# Patient Record
Sex: FEMALE | Race: ASIAN | NOT HISPANIC OR LATINO
[De-identification: names, ages, dates, MRNs, and addresses within clinical notes are randomized per-mention and may not be internally consistent; named-entity substitution may affect disease eponyms.]

---

## 2017-06-12 ENCOUNTER — RESULT REVIEW (OUTPATIENT)
Age: 27
End: 2017-06-12

## 2017-08-04 ENCOUNTER — APPOINTMENT (OUTPATIENT)
Dept: OPHTHALMOLOGY | Facility: CLINIC | Age: 27
End: 2017-08-04
Payer: COMMERCIAL

## 2017-08-04 PROCEDURE — 92004 COMPRE OPH EXAM NEW PT 1/>: CPT

## 2017-08-04 PROCEDURE — 92020 GONIOSCOPY: CPT

## 2017-08-09 ENCOUNTER — APPOINTMENT (OUTPATIENT)
Dept: OPHTHALMOLOGY | Facility: CLINIC | Age: 27
End: 2017-08-09
Payer: SELF-PAY

## 2017-08-09 PROCEDURE — 92015 DETERMINE REFRACTIVE STATE: CPT

## 2017-08-28 ENCOUNTER — EMERGENCY (EMERGENCY)
Facility: HOSPITAL | Age: 27
LOS: 1 days | Discharge: HOME | End: 2017-08-28
Attending: EMERGENCY MEDICINE | Admitting: EMERGENCY MEDICINE
Payer: COMMERCIAL

## 2017-08-28 VITALS
HEART RATE: 87 BPM | RESPIRATION RATE: 18 BRPM | SYSTOLIC BLOOD PRESSURE: 130 MMHG | OXYGEN SATURATION: 100 % | DIASTOLIC BLOOD PRESSURE: 89 MMHG | TEMPERATURE: 98 F

## 2017-08-28 VITALS
DIASTOLIC BLOOD PRESSURE: 86 MMHG | RESPIRATION RATE: 17 BRPM | HEART RATE: 92 BPM | WEIGHT: 119.93 LBS | SYSTOLIC BLOOD PRESSURE: 123 MMHG | OXYGEN SATURATION: 98 % | TEMPERATURE: 98 F

## 2017-08-28 DIAGNOSIS — Z77.21 CONTACT WITH AND (SUSPECTED) EXPOSURE TO POTENTIALLY HAZARDOUS BODY FLUIDS: ICD-10-CM

## 2017-08-28 DIAGNOSIS — S69.92XA UNSPECIFIED INJURY OF LEFT WRIST, HAND AND FINGER(S), INITIAL ENCOUNTER: ICD-10-CM

## 2017-08-28 DIAGNOSIS — Z79.899 OTHER LONG TERM (CURRENT) DRUG THERAPY: ICD-10-CM
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
## 2017-08-28 LAB
ALBUMIN SERPL ELPH-MCNC: 4.7 G/DL — SIGNIFICANT CHANGE UP (ref 3.3–5)
ALP SERPL-CCNC: 48 U/L — SIGNIFICANT CHANGE UP (ref 40–120)
ALT FLD-CCNC: 10 U/L — SIGNIFICANT CHANGE UP (ref 10–45)
ANION GAP SERPL CALC-SCNC: 13 MMOL/L — SIGNIFICANT CHANGE UP (ref 5–17)
AST SERPL-CCNC: 16 U/L — SIGNIFICANT CHANGE UP (ref 10–40)
BASOPHILS NFR BLD AUTO: 0.4 % — SIGNIFICANT CHANGE UP (ref 0–2)
BILIRUB SERPL-MCNC: 0.5 MG/DL — SIGNIFICANT CHANGE UP (ref 0.2–1.2)
BUN SERPL-MCNC: 18 MG/DL — SIGNIFICANT CHANGE UP (ref 7–23)
CALCIUM SERPL-MCNC: 9.8 MG/DL — SIGNIFICANT CHANGE UP (ref 8.4–10.5)
CHLORIDE SERPL-SCNC: 100 MMOL/L — SIGNIFICANT CHANGE UP (ref 96–108)
CO2 SERPL-SCNC: 26 MMOL/L — SIGNIFICANT CHANGE UP (ref 22–31)
CREAT SERPL-MCNC: 0.8 MG/DL — SIGNIFICANT CHANGE UP (ref 0.5–1.3)
EOSINOPHIL NFR BLD AUTO: 1.5 % — SIGNIFICANT CHANGE UP (ref 0–6)
GLUCOSE SERPL-MCNC: 87 MG/DL — SIGNIFICANT CHANGE UP (ref 70–99)
HBV SURFACE AG SER-ACNC: SIGNIFICANT CHANGE UP
HCT VFR BLD CALC: 40.6 % — SIGNIFICANT CHANGE UP (ref 34.5–45)
HCV AB S/CO SERPL IA: 0.08 S/CO — SIGNIFICANT CHANGE UP
HCV AB SERPL-IMP: SIGNIFICANT CHANGE UP
HGB BLD-MCNC: 14.4 G/DL — SIGNIFICANT CHANGE UP (ref 11.5–15.5)
HIV 1+2 AB+HIV1 P24 AG SERPL QL IA: SIGNIFICANT CHANGE UP
LYMPHOCYTES # BLD AUTO: 26.3 % — SIGNIFICANT CHANGE UP (ref 13–44)
MCHC RBC-ENTMCNC: 30.9 PG — SIGNIFICANT CHANGE UP (ref 27–34)
MCHC RBC-ENTMCNC: 35.5 G/DL — SIGNIFICANT CHANGE UP (ref 32–36)
MCV RBC AUTO: 87.1 FL — SIGNIFICANT CHANGE UP (ref 80–100)
MONOCYTES NFR BLD AUTO: 7.6 % — SIGNIFICANT CHANGE UP (ref 2–14)
NEUTROPHILS NFR BLD AUTO: 64.2 % — SIGNIFICANT CHANGE UP (ref 43–77)
PLATELET # BLD AUTO: 284 K/UL — SIGNIFICANT CHANGE UP (ref 150–400)
POTASSIUM SERPL-MCNC: 3.7 MMOL/L — SIGNIFICANT CHANGE UP (ref 3.5–5.3)
POTASSIUM SERPL-SCNC: 3.7 MMOL/L — SIGNIFICANT CHANGE UP (ref 3.5–5.3)
PROT SERPL-MCNC: 8.1 G/DL — SIGNIFICANT CHANGE UP (ref 6–8.3)
RBC # BLD: 4.66 M/UL — SIGNIFICANT CHANGE UP (ref 3.8–5.2)
RBC # FLD: 12.3 % — SIGNIFICANT CHANGE UP (ref 10.3–16.9)
SODIUM SERPL-SCNC: 139 MMOL/L — SIGNIFICANT CHANGE UP (ref 135–145)
WBC # BLD: 7.4 K/UL — SIGNIFICANT CHANGE UP (ref 3.8–10.5)
WBC # FLD AUTO: 7.4 K/UL — SIGNIFICANT CHANGE UP (ref 3.8–10.5)

## 2017-08-28 PROCEDURE — 80074 ACUTE HEPATITIS PANEL: CPT

## 2017-08-28 PROCEDURE — 87389 HIV-1 AG W/HIV-1&-2 AB AG IA: CPT

## 2017-08-28 PROCEDURE — 80053 COMPREHEN METABOLIC PANEL: CPT

## 2017-08-28 PROCEDURE — 36415 COLL VENOUS BLD VENIPUNCTURE: CPT

## 2017-08-28 PROCEDURE — 99283 EMERGENCY DEPT VISIT LOW MDM: CPT

## 2017-08-28 PROCEDURE — 99284 EMERGENCY DEPT VISIT MOD MDM: CPT

## 2017-08-28 PROCEDURE — 85025 COMPLETE CBC W/AUTO DIFF WBC: CPT

## 2017-08-28 RX ORDER — EMTRICITABINE AND TENOFOVIR DISOPROXIL FUMARATE 200; 300 MG/1; MG/1
1 TABLET, FILM COATED ORAL ONCE
Qty: 0 | Refills: 0 | Status: COMPLETED | OUTPATIENT
Start: 2017-08-28 | End: 2017-08-28

## 2017-08-28 RX ORDER — RALTEGRAVIR 400 MG/1
1 TABLET, FILM COATED ORAL
Qty: 14 | Refills: 0 | OUTPATIENT
Start: 2017-08-28 | End: 2017-09-04

## 2017-08-28 RX ORDER — RALTEGRAVIR 400 MG/1
400 TABLET, FILM COATED ORAL ONCE
Qty: 0 | Refills: 0 | Status: COMPLETED | OUTPATIENT
Start: 2017-08-28 | End: 2017-08-28

## 2017-08-28 RX ORDER — EMTRICITABINE AND TENOFOVIR DISOPROXIL FUMARATE 200; 300 MG/1; MG/1
1 TABLET, FILM COATED ORAL
Qty: 7 | Refills: 0 | OUTPATIENT
Start: 2017-08-28 | End: 2017-09-04

## 2017-08-28 RX ADMIN — EMTRICITABINE AND TENOFOVIR DISOPROXIL FUMARATE 1 TABLET(S): 200; 300 TABLET, FILM COATED ORAL at 18:26

## 2017-08-28 RX ADMIN — RALTEGRAVIR 400 MILLIGRAM(S): 400 TABLET, FILM COATED ORAL at 18:26

## 2017-08-28 NOTE — ED ADULT NURSE REASSESSMENT NOTE - NS ED NURSE REASSESS COMMENT FT1
Patient reports that she does not want to take HIV prophylaxis at this time, wants to think about it. MD Baum made aware.

## 2017-08-28 NOTE — ED PROVIDER NOTE - CARE PLAN
Principal Discharge DX:	Needle stick injury Principal Discharge DX:	Needle stick injury  Secondary Diagnosis:	Exposure to blood

## 2017-08-28 NOTE — ED PROVIDER NOTE - OBJECTIVE STATEMENT
26 yo F s/p exposure to blood 2 hrs ago- has open wound on radial aspect of left index finger - exposure was transient- she washed the area with soap and water- TET UTD- has had Hep B vaccination series- no information regarding source pt at this time.

## 2017-08-28 NOTE — ED PROVIDER NOTE - MEDICAL DECISION MAKING DETAILS
needle stick prophylaxis offered  pt declined in ED  will RX 1 week supply to [facility] ED pharm  tet utlucille  hepatitis utd blood exposure to possible open area near radial aspect adjacent to nail fold---needle stick prophylaxis offered  pt declined in ED  will RX 1 week supply to [facility] ED pharm  jose yates  hepatitis utd

## 2017-08-29 LAB
HAV IGM SER-ACNC: SIGNIFICANT CHANGE UP
HBV CORE IGM SER-ACNC: SIGNIFICANT CHANGE UP

## 2018-07-03 ENCOUNTER — APPOINTMENT (OUTPATIENT)
Dept: OPHTHALMOLOGY | Facility: CLINIC | Age: 28
End: 2018-07-03
Payer: COMMERCIAL

## 2018-07-03 PROCEDURE — 92012 INTRM OPH EXAM EST PATIENT: CPT

## 2018-07-30 ENCOUNTER — RESULT REVIEW (OUTPATIENT)
Age: 28
End: 2018-07-30

## 2019-08-02 ENCOUNTER — RESULT REVIEW (OUTPATIENT)
Age: 29
End: 2019-08-02

## 2019-08-06 ENCOUNTER — NON-APPOINTMENT (OUTPATIENT)
Age: 29
End: 2019-08-06

## 2019-08-06 ENCOUNTER — APPOINTMENT (OUTPATIENT)
Dept: OPHTHALMOLOGY | Facility: CLINIC | Age: 29
End: 2019-08-06
Payer: COMMERCIAL

## 2019-08-06 PROCEDURE — 92012 INTRM OPH EXAM EST PATIENT: CPT

## 2019-12-11 ENCOUNTER — APPOINTMENT (OUTPATIENT)
Dept: GASTROENTEROLOGY | Facility: CLINIC | Age: 29
End: 2019-12-11

## 2019-12-24 ENCOUNTER — APPOINTMENT (OUTPATIENT)
Dept: GASTROENTEROLOGY | Facility: CLINIC | Age: 29
End: 2019-12-24

## 2020-04-26 ENCOUNTER — MESSAGE (OUTPATIENT)
Age: 30
End: 2020-04-26

## 2020-05-05 LAB
SARS-COV-2 IGG SERPL IA-ACNC: <0.1 INDEX
SARS-COV-2 IGG SERPL QL IA: NEGATIVE

## 2020-09-01 ENCOUNTER — NON-APPOINTMENT (OUTPATIENT)
Age: 30
End: 2020-09-01

## 2020-09-01 ENCOUNTER — APPOINTMENT (OUTPATIENT)
Dept: OPHTHALMOLOGY | Facility: CLINIC | Age: 30
End: 2020-09-01
Payer: COMMERCIAL

## 2020-09-01 PROCEDURE — 92012 INTRM OPH EXAM EST PATIENT: CPT

## 2021-05-21 ENCOUNTER — TRANSCRIPTION ENCOUNTER (OUTPATIENT)
Age: 31
End: 2021-05-21

## 2021-10-06 ENCOUNTER — NON-APPOINTMENT (OUTPATIENT)
Age: 31
End: 2021-10-06

## 2021-10-22 ENCOUNTER — RESULT REVIEW (OUTPATIENT)
Age: 31
End: 2021-10-22

## 2021-10-22 ENCOUNTER — APPOINTMENT (OUTPATIENT)
Dept: INTERNAL MEDICINE | Facility: CLINIC | Age: 31
End: 2021-10-22

## 2021-11-02 ENCOUNTER — NON-APPOINTMENT (OUTPATIENT)
Age: 31
End: 2021-11-02

## 2021-11-05 ENCOUNTER — APPOINTMENT (OUTPATIENT)
Dept: ORTHOPEDIC SURGERY | Facility: CLINIC | Age: 31
End: 2021-11-05
Payer: COMMERCIAL

## 2021-11-05 VITALS — WEIGHT: 130 LBS | RESPIRATION RATE: 16 BRPM | BODY MASS INDEX: 23.33 KG/M2 | HEIGHT: 62.5 IN

## 2021-11-05 DIAGNOSIS — Z78.9 OTHER SPECIFIED HEALTH STATUS: ICD-10-CM

## 2021-11-05 PROCEDURE — 99203 OFFICE O/P NEW LOW 30 MIN: CPT

## 2022-05-06 ENCOUNTER — APPOINTMENT (OUTPATIENT)
Dept: OPHTHALMOLOGY | Facility: CLINIC | Age: 32
End: 2022-05-06

## 2022-05-13 ENCOUNTER — APPOINTMENT (OUTPATIENT)
Dept: OPHTHALMOLOGY | Facility: CLINIC | Age: 32
End: 2022-05-13
Payer: COMMERCIAL

## 2022-05-13 ENCOUNTER — NON-APPOINTMENT (OUTPATIENT)
Age: 32
End: 2022-05-13

## 2022-05-13 PROCEDURE — 92014 COMPRE OPH EXAM EST PT 1/>: CPT

## 2022-05-16 ENCOUNTER — NON-APPOINTMENT (OUTPATIENT)
Age: 32
End: 2022-05-16

## 2022-05-31 NOTE — ED PROVIDER NOTE - NS_EDPROVIDERDISPOUSERTYPE_ED_A_ED
Attending Attestation (For Attendings USE Only)... Erythromycin Pregnancy And Lactation Text: This medication is Pregnancy Category B and is considered safe during pregnancy. It is also excreted in breast milk.

## 2022-06-14 ENCOUNTER — RESULT REVIEW (OUTPATIENT)
Age: 32
End: 2022-06-14

## 2022-08-29 ENCOUNTER — NON-APPOINTMENT (OUTPATIENT)
Age: 32
End: 2022-08-29

## 2022-09-20 ENCOUNTER — APPOINTMENT (OUTPATIENT)
Dept: FAMILY MEDICINE | Facility: CLINIC | Age: 32
End: 2022-09-20

## 2022-09-20 VITALS
DIASTOLIC BLOOD PRESSURE: 78 MMHG | HEIGHT: 62.5 IN | RESPIRATION RATE: 14 BRPM | HEART RATE: 72 BPM | TEMPERATURE: 98.2 F | BODY MASS INDEX: 23.72 KG/M2 | OXYGEN SATURATION: 98 % | WEIGHT: 132.19 LBS | SYSTOLIC BLOOD PRESSURE: 112 MMHG

## 2022-09-20 DIAGNOSIS — Z11.3 ENCOUNTER FOR SCREENING FOR INFECTIONS WITH A PREDOMINANTLY SEXUAL MODE OF TRANSMISSION: ICD-10-CM

## 2022-09-20 DIAGNOSIS — E28.2 POLYCYSTIC OVARIAN SYNDROME: ICD-10-CM

## 2022-09-20 DIAGNOSIS — H10.13 ACUTE ATOPIC CONJUNCTIVITIS, BILATERAL: ICD-10-CM

## 2022-09-20 DIAGNOSIS — H52.223 REGULAR ASTIGMATISM, BILATERAL: ICD-10-CM

## 2022-09-20 DIAGNOSIS — H04.123 DRY EYE SYNDROME OF BILATERAL LACRIMAL GLANDS: ICD-10-CM

## 2022-09-20 DIAGNOSIS — Z82.49 FAMILY HISTORY OF ISCHEMIC HEART DISEASE AND OTHER DISEASES OF THE CIRCULATORY SYSTEM: ICD-10-CM

## 2022-09-20 DIAGNOSIS — R51.9 HEADACHE, UNSPECIFIED: ICD-10-CM

## 2022-09-20 DIAGNOSIS — M65.331 TRIGGER FINGER, RIGHT MIDDLE FINGER: ICD-10-CM

## 2022-09-20 DIAGNOSIS — H52.11 MYOPIA, RIGHT EYE: ICD-10-CM

## 2022-09-20 DIAGNOSIS — Z00.00 ENCOUNTER FOR GENERAL ADULT MEDICAL EXAMINATION W/OUT ABNORMAL FINDINGS: ICD-10-CM

## 2022-09-20 PROCEDURE — 36415 COLL VENOUS BLD VENIPUNCTURE: CPT

## 2022-09-20 PROCEDURE — 99204 OFFICE O/P NEW MOD 45 MIN: CPT | Mod: 25

## 2022-09-20 PROCEDURE — 81002 URINALYSIS NONAUTO W/O SCOPE: CPT

## 2022-09-20 PROCEDURE — 99385 PREV VISIT NEW AGE 18-39: CPT | Mod: 25

## 2022-09-20 NOTE — HISTORY OF PRESENT ILLNESS
[FreeTextEntry1] : establish care \par headaches  [de-identified] : 31 yo f presents to establish care. \par Complains of headaches x 1 month, occur in the back of the head, left side. No radiation of pain, occur a few times per week. Sometimes last a few hours, sometimes the full day.  Tylenol will help the headache after a few hours. Admits increased hydration since the headaches started (now 4 bottles of water), has not affected the frequency of headaches. Sleeps well for the most part, approximately 6 hours per night, patient is a light sleeper. Occurs at 2-3 pm, never on the weekend. No stress or anxiety at work. Recently had her eyes checked, and all was wnl. Mentioned history of headaches in the past, never this constant. No changes to her lifestyle. Mentioned pressure in the back of the head. 3-6/10 at their worst, no vision changes, no balance changes. Light or sound does not make headache worse. Mentioned poor posture.

## 2022-09-20 NOTE — HEALTH RISK ASSESSMENT
[Good] : ~his/her~  mood as  good [Never] : Never [Yes] : Yes [2 - 4 times a month (2 pts)] : 2-4 times a month (2 points) [1 or 2 (0 pts)] : 1 or 2 (0 points) [Never (0 pts)] : Never (0 points) [No] : In the past 12 months have you used drugs other than those required for medical reasons? No [0] : 2) Feeling down, depressed, or hopeless: Not at all (0) [PHQ-2 Negative - No further assessment needed] : PHQ-2 Negative - No further assessment needed [Audit-CScore] : 2 [de-identified] : walking, [place]  [de-identified] : fruits, veggies  [[id number]] : 0 [Patient reported PAP Smear was normal] : Patient reported PAP Smear was normal [HIV Test offered] : HIV Test offered [Hepatitis C test offered] : Hepatitis C test offered [Change in mental status noted] : No change in mental status noted [Language] : denies difficulty with language [None] : None [With Significant Other] : lives with significant other [Employed] : employed [] :  [Sexually Active] : sexually active [High Risk Behavior] : no high risk behavior [Feels Safe at Home] : Feels safe at home [Fully functional (bathing, dressing, toileting, transferring, walking, feeding)] : Fully functional (bathing, dressing, toileting, transferring, walking, feeding) [Fully functional (using the telephone, shopping, preparing meals, housekeeping, doing laundry, using] : Fully functional and needs no help or supervision to perform IADLs (using the telephone, shopping, preparing meals, housekeeping, doing laundry, using transportation, managing medications and managing finances) [Reports changes in hearing] : Reports no changes in hearing [Reports changes in vision] : Reports no changes in vision [PapSmearDate] : 02/22 [FreeTextEntry2] : 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  [de-identified] : m

## 2022-09-20 NOTE — PLAN
[FreeTextEntry1] : follow up labs, labs drawn in office \par \par headaches \par increased frequency, lasting longer \par will send for mri \par discussed supportive options as well until resulted-- posture, massage, sleep hygiene, hydration, stress reduction \par otc meds prn

## 2022-09-28 LAB
25(OH)D3 SERPL-MCNC: 35.9 NG/ML
ALBUMIN SERPL ELPH-MCNC: 4.9 G/DL
ALP BLD-CCNC: 59 U/L
ALT SERPL-CCNC: 16 U/L
ANION GAP SERPL CALC-SCNC: 12 MMOL/L
AST SERPL-CCNC: 18 U/L
BASOPHILS # BLD AUTO: 0.04 K/UL
BASOPHILS NFR BLD AUTO: 0.7 %
BILIRUB SERPL-MCNC: 0.6 MG/DL
BUN SERPL-MCNC: 13 MG/DL
C TRACH RRNA SPEC QL NAA+PROBE: NOT DETECTED
CALCIUM SERPL-MCNC: 9.5 MG/DL
CHLORIDE SERPL-SCNC: 104 MMOL/L
CHOLEST SERPL-MCNC: 169 MG/DL
CO2 SERPL-SCNC: 24 MMOL/L
CREAT SERPL-MCNC: 0.63 MG/DL
EGFR: 121 ML/MIN/1.73M2
EOSINOPHIL # BLD AUTO: 0.07 K/UL
EOSINOPHIL NFR BLD AUTO: 1.1 %
ESTIMATED AVERAGE GLUCOSE: 103 MG/DL
GLUCOSE SERPL-MCNC: 94 MG/DL
HAV IGM SER QL: NONREACTIVE
HBA1C MFR BLD HPLC: 5.2 %
HBV CORE IGM SER QL: NONREACTIVE
HBV SURFACE AG SER QL: NONREACTIVE
HCT VFR BLD CALC: 44.3 %
HCV AB SER QL: NONREACTIVE
HCV S/CO RATIO: 0.09 S/CO
HDLC SERPL-MCNC: 61 MG/DL
HGB BLD-MCNC: 13.8 G/DL
HIV1+2 AB SPEC QL IA.RAPID: NONREACTIVE
IMM GRANULOCYTES NFR BLD AUTO: 0 %
LDLC SERPL CALC-MCNC: 88 MG/DL
LYMPHOCYTES # BLD AUTO: 2.15 K/UL
LYMPHOCYTES NFR BLD AUTO: 35 %
MAN DIFF?: NORMAL
MCHC RBC-ENTMCNC: 29.1 PG
MCHC RBC-ENTMCNC: 31.2 GM/DL
MCV RBC AUTO: 93.5 FL
MONOCYTES # BLD AUTO: 0.41 K/UL
MONOCYTES NFR BLD AUTO: 6.7 %
N GONORRHOEA RRNA SPEC QL NAA+PROBE: NOT DETECTED
NEUTROPHILS # BLD AUTO: 3.48 K/UL
NEUTROPHILS NFR BLD AUTO: 56.5 %
NONHDLC SERPL-MCNC: 108 MG/DL
PLATELET # BLD AUTO: 278 K/UL
POTASSIUM SERPL-SCNC: 4.3 MMOL/L
PROT SERPL-MCNC: 7.5 G/DL
RBC # BLD: 4.74 M/UL
RBC # FLD: 13.3 %
SODIUM SERPL-SCNC: 140 MMOL/L
SOURCE AMPLIFICATION: NORMAL
T PALLIDUM AB SER QL IA: NEGATIVE
TRIGL SERPL-MCNC: 99 MG/DL
TSH SERPL-ACNC: 1.39 UIU/ML
WBC # FLD AUTO: 6.15 K/UL

## 2022-10-03 ENCOUNTER — NON-APPOINTMENT (OUTPATIENT)
Age: 32
End: 2022-10-03

## 2023-04-06 ENCOUNTER — APPOINTMENT (OUTPATIENT)
Dept: HUMAN REPRODUCTION | Facility: CLINIC | Age: 33
End: 2023-04-06
Payer: COMMERCIAL

## 2023-04-06 PROCEDURE — 99204 OFFICE O/P NEW MOD 45 MIN: CPT | Mod: 95

## 2023-04-12 ENCOUNTER — APPOINTMENT (OUTPATIENT)
Dept: HUMAN REPRODUCTION | Facility: CLINIC | Age: 33
End: 2023-04-12
Payer: COMMERCIAL

## 2023-04-12 PROCEDURE — 99213 OFFICE O/P EST LOW 20 MIN: CPT | Mod: 25

## 2023-04-12 PROCEDURE — 36415 COLL VENOUS BLD VENIPUNCTURE: CPT

## 2023-04-12 PROCEDURE — 76857 US EXAM PELVIC LIMITED: CPT

## 2023-07-06 ENCOUNTER — APPOINTMENT (OUTPATIENT)
Dept: HUMAN REPRODUCTION | Facility: CLINIC | Age: 33
End: 2023-07-06
Payer: COMMERCIAL

## 2023-07-06 PROCEDURE — 99214 OFFICE O/P EST MOD 30 MIN: CPT | Mod: 95

## 2023-07-12 ENCOUNTER — APPOINTMENT (OUTPATIENT)
Dept: HUMAN REPRODUCTION | Facility: CLINIC | Age: 33
End: 2023-07-12
Payer: COMMERCIAL

## 2023-07-12 PROCEDURE — 36415 COLL VENOUS BLD VENIPUNCTURE: CPT

## 2023-07-12 PROCEDURE — 76830 TRANSVAGINAL US NON-OB: CPT

## 2023-07-12 PROCEDURE — S4042: CPT

## 2023-07-28 ENCOUNTER — APPOINTMENT (OUTPATIENT)
Dept: HUMAN REPRODUCTION | Facility: CLINIC | Age: 33
End: 2023-07-28
Payer: COMMERCIAL

## 2023-07-28 PROCEDURE — S4042: CPT

## 2023-07-28 PROCEDURE — 99213 OFFICE O/P EST LOW 20 MIN: CPT | Mod: 25

## 2023-07-28 PROCEDURE — 36415 COLL VENOUS BLD VENIPUNCTURE: CPT

## 2023-07-28 PROCEDURE — 82670 ASSAY OF TOTAL ESTRADIOL: CPT

## 2023-07-28 PROCEDURE — 76830 TRANSVAGINAL US NON-OB: CPT

## 2023-07-28 PROCEDURE — 84702 CHORIONIC GONADOTROPIN TEST: CPT

## 2023-07-28 PROCEDURE — 84144 ASSAY OF PROGESTERONE: CPT

## 2023-08-04 ENCOUNTER — APPOINTMENT (OUTPATIENT)
Dept: HUMAN REPRODUCTION | Facility: CLINIC | Age: 33
End: 2023-08-04
Payer: SELF-PAY

## 2023-08-04 PROCEDURE — 84144 ASSAY OF PROGESTERONE: CPT

## 2023-08-04 PROCEDURE — 83002 ASSAY OF GONADOTROPIN (LH): CPT | Mod: QW

## 2023-08-04 PROCEDURE — 36415 COLL VENOUS BLD VENIPUNCTURE: CPT

## 2023-08-04 PROCEDURE — 76857 US EXAM PELVIC LIMITED: CPT

## 2023-08-04 PROCEDURE — 82670 ASSAY OF TOTAL ESTRADIOL: CPT

## 2023-08-14 ENCOUNTER — APPOINTMENT (OUTPATIENT)
Dept: HUMAN REPRODUCTION | Facility: CLINIC | Age: 33
End: 2023-08-14
Payer: SELF-PAY

## 2023-08-14 PROCEDURE — 84144 ASSAY OF PROGESTERONE: CPT

## 2023-08-14 PROCEDURE — 36415 COLL VENOUS BLD VENIPUNCTURE: CPT

## 2023-08-14 PROCEDURE — 99213 OFFICE O/P EST LOW 20 MIN: CPT | Mod: 25

## 2023-08-14 PROCEDURE — 82670 ASSAY OF TOTAL ESTRADIOL: CPT

## 2023-08-14 PROCEDURE — 76857 US EXAM PELVIC LIMITED: CPT

## 2023-08-14 PROCEDURE — 83002 ASSAY OF GONADOTROPIN (LH): CPT | Mod: QW

## 2023-08-16 ENCOUNTER — APPOINTMENT (OUTPATIENT)
Dept: HUMAN REPRODUCTION | Facility: CLINIC | Age: 33
End: 2023-08-16
Payer: SELF-PAY

## 2023-08-16 PROCEDURE — 58322 ARTIFICIAL INSEMINATION: CPT

## 2023-08-16 PROCEDURE — 99213 OFFICE O/P EST LOW 20 MIN: CPT | Mod: 25

## 2023-08-16 PROCEDURE — 89261 SPERM ISOLATION COMPLEX: CPT

## 2023-08-30 ENCOUNTER — APPOINTMENT (OUTPATIENT)
Dept: HUMAN REPRODUCTION | Facility: CLINIC | Age: 33
End: 2023-08-30
Payer: SELF-PAY

## 2023-08-30 PROCEDURE — 84702 CHORIONIC GONADOTROPIN TEST: CPT

## 2023-08-30 PROCEDURE — 36415 COLL VENOUS BLD VENIPUNCTURE: CPT

## 2023-08-30 PROCEDURE — 84144 ASSAY OF PROGESTERONE: CPT

## 2023-09-05 ENCOUNTER — APPOINTMENT (OUTPATIENT)
Dept: HUMAN REPRODUCTION | Facility: CLINIC | Age: 33
End: 2023-09-05
Payer: SELF-PAY

## 2023-09-05 PROCEDURE — 83002 ASSAY OF GONADOTROPIN (LH): CPT | Mod: QW

## 2023-09-05 PROCEDURE — S4042: CPT

## 2023-09-05 PROCEDURE — 76857 US EXAM PELVIC LIMITED: CPT

## 2023-09-05 PROCEDURE — 83001 ASSAY OF GONADOTROPIN (FSH): CPT | Mod: QW

## 2023-09-05 PROCEDURE — 99213 OFFICE O/P EST LOW 20 MIN: CPT | Mod: 25

## 2023-09-05 PROCEDURE — 82670 ASSAY OF TOTAL ESTRADIOL: CPT

## 2023-09-05 PROCEDURE — 36415 COLL VENOUS BLD VENIPUNCTURE: CPT

## 2023-09-05 PROCEDURE — 84702 CHORIONIC GONADOTROPIN TEST: CPT

## 2023-09-05 PROCEDURE — 84144 ASSAY OF PROGESTERONE: CPT

## 2023-09-12 ENCOUNTER — APPOINTMENT (OUTPATIENT)
Dept: HUMAN REPRODUCTION | Facility: CLINIC | Age: 33
End: 2023-09-12
Payer: SELF-PAY

## 2023-09-12 PROCEDURE — 83002 ASSAY OF GONADOTROPIN (LH): CPT | Mod: QW

## 2023-09-12 PROCEDURE — 36415 COLL VENOUS BLD VENIPUNCTURE: CPT

## 2023-09-12 PROCEDURE — 76857 US EXAM PELVIC LIMITED: CPT

## 2023-09-12 PROCEDURE — 82670 ASSAY OF TOTAL ESTRADIOL: CPT

## 2023-09-12 PROCEDURE — 99213 OFFICE O/P EST LOW 20 MIN: CPT | Mod: 25

## 2023-09-12 PROCEDURE — 84144 ASSAY OF PROGESTERONE: CPT

## 2023-09-14 ENCOUNTER — APPOINTMENT (OUTPATIENT)
Dept: HUMAN REPRODUCTION | Facility: CLINIC | Age: 33
End: 2023-09-14
Payer: SELF-PAY

## 2023-09-14 PROCEDURE — 99213 OFFICE O/P EST LOW 20 MIN: CPT | Mod: 25

## 2023-09-14 PROCEDURE — 82670 ASSAY OF TOTAL ESTRADIOL: CPT

## 2023-09-14 PROCEDURE — 83002 ASSAY OF GONADOTROPIN (LH): CPT | Mod: QW

## 2023-09-14 PROCEDURE — 76857 US EXAM PELVIC LIMITED: CPT

## 2023-09-14 PROCEDURE — 36415 COLL VENOUS BLD VENIPUNCTURE: CPT

## 2023-09-14 PROCEDURE — 84144 ASSAY OF PROGESTERONE: CPT

## 2023-09-17 ENCOUNTER — APPOINTMENT (OUTPATIENT)
Dept: HUMAN REPRODUCTION | Facility: CLINIC | Age: 33
End: 2023-09-17
Payer: SELF-PAY

## 2023-09-17 PROCEDURE — 36415 COLL VENOUS BLD VENIPUNCTURE: CPT

## 2023-09-17 PROCEDURE — 99213 OFFICE O/P EST LOW 20 MIN: CPT | Mod: 25

## 2023-09-17 PROCEDURE — 76857 US EXAM PELVIC LIMITED: CPT

## 2023-09-18 ENCOUNTER — APPOINTMENT (OUTPATIENT)
Dept: HUMAN REPRODUCTION | Facility: CLINIC | Age: 33
End: 2023-09-18
Payer: SELF-PAY

## 2023-09-18 PROCEDURE — 89261 SPERM ISOLATION COMPLEX: CPT

## 2023-09-18 PROCEDURE — 99213 OFFICE O/P EST LOW 20 MIN: CPT | Mod: 25

## 2023-09-18 PROCEDURE — 58322 ARTIFICIAL INSEMINATION: CPT

## 2023-10-02 ENCOUNTER — APPOINTMENT (OUTPATIENT)
Dept: HUMAN REPRODUCTION | Facility: CLINIC | Age: 33
End: 2023-10-02
Payer: SELF-PAY

## 2023-10-02 PROCEDURE — 36415 COLL VENOUS BLD VENIPUNCTURE: CPT

## 2023-10-09 ENCOUNTER — APPOINTMENT (OUTPATIENT)
Dept: HUMAN REPRODUCTION | Facility: CLINIC | Age: 33
End: 2023-10-09
Payer: SELF-PAY

## 2023-10-09 PROCEDURE — 36415 COLL VENOUS BLD VENIPUNCTURE: CPT

## 2023-10-09 PROCEDURE — 76830 TRANSVAGINAL US NON-OB: CPT

## 2023-10-09 PROCEDURE — S4042: CPT

## 2023-10-09 PROCEDURE — 83001 ASSAY OF GONADOTROPIN (FSH): CPT | Mod: QW

## 2023-10-09 PROCEDURE — 84702 CHORIONIC GONADOTROPIN TEST: CPT

## 2023-10-09 PROCEDURE — 84144 ASSAY OF PROGESTERONE: CPT

## 2023-10-09 PROCEDURE — 82670 ASSAY OF TOTAL ESTRADIOL: CPT

## 2023-10-09 PROCEDURE — 83002 ASSAY OF GONADOTROPIN (LH): CPT | Mod: QW

## 2023-10-16 ENCOUNTER — APPOINTMENT (OUTPATIENT)
Dept: HUMAN REPRODUCTION | Facility: CLINIC | Age: 33
End: 2023-10-16
Payer: SELF-PAY

## 2023-10-16 PROCEDURE — 36415 COLL VENOUS BLD VENIPUNCTURE: CPT

## 2023-10-16 PROCEDURE — 76857 US EXAM PELVIC LIMITED: CPT

## 2023-10-16 PROCEDURE — 99213 OFFICE O/P EST LOW 20 MIN: CPT | Mod: 25

## 2023-10-19 ENCOUNTER — APPOINTMENT (OUTPATIENT)
Dept: HUMAN REPRODUCTION | Facility: CLINIC | Age: 33
End: 2023-10-19
Payer: SELF-PAY

## 2023-10-19 PROCEDURE — 84144 ASSAY OF PROGESTERONE: CPT

## 2023-10-19 PROCEDURE — 36415 COLL VENOUS BLD VENIPUNCTURE: CPT

## 2023-10-19 PROCEDURE — 83002 ASSAY OF GONADOTROPIN (LH): CPT | Mod: QW

## 2023-10-19 PROCEDURE — 82670 ASSAY OF TOTAL ESTRADIOL: CPT

## 2023-10-19 PROCEDURE — 76857 US EXAM PELVIC LIMITED: CPT

## 2023-10-19 PROCEDURE — 99213 OFFICE O/P EST LOW 20 MIN: CPT | Mod: 25

## 2023-10-24 ENCOUNTER — APPOINTMENT (OUTPATIENT)
Dept: HUMAN REPRODUCTION | Facility: CLINIC | Age: 33
End: 2023-10-24
Payer: SELF-PAY

## 2023-10-24 PROCEDURE — 82670 ASSAY OF TOTAL ESTRADIOL: CPT

## 2023-10-24 PROCEDURE — 76857 US EXAM PELVIC LIMITED: CPT

## 2023-10-24 PROCEDURE — 83002 ASSAY OF GONADOTROPIN (LH): CPT | Mod: QW

## 2023-10-24 PROCEDURE — 84144 ASSAY OF PROGESTERONE: CPT

## 2023-10-24 PROCEDURE — 36415 COLL VENOUS BLD VENIPUNCTURE: CPT

## 2023-10-24 PROCEDURE — 99213 OFFICE O/P EST LOW 20 MIN: CPT | Mod: 25

## 2023-10-26 ENCOUNTER — APPOINTMENT (OUTPATIENT)
Dept: HUMAN REPRODUCTION | Facility: CLINIC | Age: 33
End: 2023-10-26
Payer: SELF-PAY

## 2023-10-26 PROCEDURE — 99213 OFFICE O/P EST LOW 20 MIN: CPT | Mod: 25

## 2023-10-26 PROCEDURE — 84144 ASSAY OF PROGESTERONE: CPT

## 2023-10-26 PROCEDURE — 83002 ASSAY OF GONADOTROPIN (LH): CPT | Mod: QW

## 2023-10-26 PROCEDURE — 36415 COLL VENOUS BLD VENIPUNCTURE: CPT

## 2023-10-26 PROCEDURE — 76857 US EXAM PELVIC LIMITED: CPT

## 2023-10-26 PROCEDURE — 82670 ASSAY OF TOTAL ESTRADIOL: CPT

## 2023-10-29 ENCOUNTER — APPOINTMENT (OUTPATIENT)
Dept: HUMAN REPRODUCTION | Facility: CLINIC | Age: 33
End: 2023-10-29
Payer: SELF-PAY

## 2023-10-29 PROCEDURE — 99213 OFFICE O/P EST LOW 20 MIN: CPT | Mod: 25

## 2023-10-29 PROCEDURE — 76857 US EXAM PELVIC LIMITED: CPT

## 2023-10-29 PROCEDURE — 36415 COLL VENOUS BLD VENIPUNCTURE: CPT

## 2023-10-30 ENCOUNTER — APPOINTMENT (OUTPATIENT)
Dept: HUMAN REPRODUCTION | Facility: CLINIC | Age: 33
End: 2023-10-30

## 2023-10-31 ENCOUNTER — APPOINTMENT (OUTPATIENT)
Dept: HUMAN REPRODUCTION | Facility: CLINIC | Age: 33
End: 2023-10-31
Payer: SELF-PAY

## 2023-10-31 PROCEDURE — 58322 ARTIFICIAL INSEMINATION: CPT

## 2023-10-31 PROCEDURE — 89261 SPERM ISOLATION COMPLEX: CPT

## 2023-10-31 PROCEDURE — 99213 OFFICE O/P EST LOW 20 MIN: CPT | Mod: 25

## 2023-11-14 ENCOUNTER — APPOINTMENT (OUTPATIENT)
Dept: HUMAN REPRODUCTION | Facility: CLINIC | Age: 33
End: 2023-11-14
Payer: SELF-PAY

## 2023-11-14 PROCEDURE — 84702 CHORIONIC GONADOTROPIN TEST: CPT

## 2023-11-14 PROCEDURE — 84144 ASSAY OF PROGESTERONE: CPT

## 2023-11-16 ENCOUNTER — APPOINTMENT (OUTPATIENT)
Dept: HUMAN REPRODUCTION | Facility: CLINIC | Age: 33
End: 2023-11-16
Payer: SELF-PAY

## 2023-11-16 PROCEDURE — 84702 CHORIONIC GONADOTROPIN TEST: CPT

## 2023-11-16 PROCEDURE — 84144 ASSAY OF PROGESTERONE: CPT

## 2023-11-17 ENCOUNTER — APPOINTMENT (OUTPATIENT)
Dept: HUMAN REPRODUCTION | Facility: CLINIC | Age: 33
End: 2023-11-17
Payer: SELF-PAY

## 2023-11-17 PROCEDURE — 83001 ASSAY OF GONADOTROPIN (FSH): CPT | Mod: QW

## 2023-11-17 PROCEDURE — 82670 ASSAY OF TOTAL ESTRADIOL: CPT

## 2023-11-17 PROCEDURE — 84144 ASSAY OF PROGESTERONE: CPT

## 2023-11-17 PROCEDURE — 84702 CHORIONIC GONADOTROPIN TEST: CPT

## 2023-11-17 PROCEDURE — 83002 ASSAY OF GONADOTROPIN (LH): CPT | Mod: QW

## 2023-11-20 ENCOUNTER — APPOINTMENT (OUTPATIENT)
Dept: HUMAN REPRODUCTION | Facility: CLINIC | Age: 33
End: 2023-11-20
Payer: SELF-PAY

## 2023-11-20 PROCEDURE — 84144 ASSAY OF PROGESTERONE: CPT

## 2023-11-20 PROCEDURE — 76857 US EXAM PELVIC LIMITED: CPT

## 2023-11-20 PROCEDURE — 36415 COLL VENOUS BLD VENIPUNCTURE: CPT

## 2023-11-20 PROCEDURE — 83002 ASSAY OF GONADOTROPIN (LH): CPT | Mod: QW

## 2023-11-20 PROCEDURE — S4042: CPT

## 2023-11-20 PROCEDURE — 99213 OFFICE O/P EST LOW 20 MIN: CPT | Mod: 25

## 2023-11-20 PROCEDURE — 84702 CHORIONIC GONADOTROPIN TEST: CPT

## 2023-11-20 PROCEDURE — 83001 ASSAY OF GONADOTROPIN (FSH): CPT | Mod: QW

## 2023-11-20 PROCEDURE — 82670 ASSAY OF TOTAL ESTRADIOL: CPT

## 2023-11-28 ENCOUNTER — APPOINTMENT (OUTPATIENT)
Dept: HUMAN REPRODUCTION | Facility: CLINIC | Age: 33
End: 2023-11-28
Payer: SELF-PAY

## 2023-11-28 PROCEDURE — 99215 OFFICE O/P EST HI 40 MIN: CPT | Mod: 95

## 2024-03-04 ENCOUNTER — APPOINTMENT (OUTPATIENT)
Dept: HUMAN REPRODUCTION | Facility: CLINIC | Age: 34
End: 2024-03-04
Payer: COMMERCIAL

## 2024-03-04 PROCEDURE — 84144 ASSAY OF PROGESTERONE: CPT

## 2024-03-04 PROCEDURE — 82670 ASSAY OF TOTAL ESTRADIOL: CPT

## 2024-03-04 PROCEDURE — 99213 OFFICE O/P EST LOW 20 MIN: CPT | Mod: 25

## 2024-03-04 PROCEDURE — 83001 ASSAY OF GONADOTROPIN (FSH): CPT | Mod: QW

## 2024-03-04 PROCEDURE — 83002 ASSAY OF GONADOTROPIN (LH): CPT | Mod: QW

## 2024-03-04 PROCEDURE — 76857 US EXAM PELVIC LIMITED: CPT

## 2024-03-04 PROCEDURE — 36415 COLL VENOUS BLD VENIPUNCTURE: CPT

## 2024-03-04 PROCEDURE — 84702 CHORIONIC GONADOTROPIN TEST: CPT

## 2024-03-12 ENCOUNTER — APPOINTMENT (OUTPATIENT)
Dept: HUMAN REPRODUCTION | Facility: CLINIC | Age: 34
End: 2024-03-12

## 2024-03-15 ENCOUNTER — APPOINTMENT (OUTPATIENT)
Dept: HUMAN REPRODUCTION | Facility: CLINIC | Age: 34
End: 2024-03-15
Payer: COMMERCIAL

## 2024-03-15 PROCEDURE — 76830 TRANSVAGINAL US NON-OB: CPT

## 2024-03-15 PROCEDURE — 99213 OFFICE O/P EST LOW 20 MIN: CPT | Mod: 25

## 2024-03-15 PROCEDURE — 84144 ASSAY OF PROGESTERONE: CPT

## 2024-03-15 PROCEDURE — 84702 CHORIONIC GONADOTROPIN TEST: CPT

## 2024-03-15 PROCEDURE — S4042: CPT

## 2024-03-15 PROCEDURE — 36415 COLL VENOUS BLD VENIPUNCTURE: CPT

## 2024-03-15 PROCEDURE — 83002 ASSAY OF GONADOTROPIN (LH): CPT | Mod: QW

## 2024-03-15 PROCEDURE — 82670 ASSAY OF TOTAL ESTRADIOL: CPT

## 2024-04-02 ENCOUNTER — APPOINTMENT (OUTPATIENT)
Dept: HUMAN REPRODUCTION | Facility: CLINIC | Age: 34
End: 2024-04-02
Payer: COMMERCIAL

## 2024-04-02 ENCOUNTER — APPOINTMENT (OUTPATIENT)
Dept: HUMAN REPRODUCTION | Facility: CLINIC | Age: 34
End: 2024-04-02

## 2024-04-02 PROCEDURE — 84702 CHORIONIC GONADOTROPIN TEST: CPT

## 2024-04-02 PROCEDURE — 36415 COLL VENOUS BLD VENIPUNCTURE: CPT

## 2024-04-02 PROCEDURE — 84144 ASSAY OF PROGESTERONE: CPT

## 2024-04-02 PROCEDURE — 76830 TRANSVAGINAL US NON-OB: CPT

## 2024-04-02 PROCEDURE — 83002 ASSAY OF GONADOTROPIN (LH): CPT | Mod: QW

## 2024-04-02 PROCEDURE — 99213 OFFICE O/P EST LOW 20 MIN: CPT | Mod: 25

## 2024-04-02 PROCEDURE — 82670 ASSAY OF TOTAL ESTRADIOL: CPT

## 2024-04-05 ENCOUNTER — APPOINTMENT (OUTPATIENT)
Dept: HUMAN REPRODUCTION | Facility: CLINIC | Age: 34
End: 2024-04-05
Payer: COMMERCIAL

## 2024-04-05 ENCOUNTER — APPOINTMENT (OUTPATIENT)
Dept: HUMAN REPRODUCTION | Facility: CLINIC | Age: 34
End: 2024-04-05

## 2024-04-05 PROCEDURE — 84702 CHORIONIC GONADOTROPIN TEST: CPT

## 2024-04-05 PROCEDURE — 76830 TRANSVAGINAL US NON-OB: CPT

## 2024-04-05 PROCEDURE — 36415 COLL VENOUS BLD VENIPUNCTURE: CPT

## 2024-04-05 PROCEDURE — S4042: CPT

## 2024-04-05 PROCEDURE — 83002 ASSAY OF GONADOTROPIN (LH): CPT | Mod: QW

## 2024-04-05 PROCEDURE — 82670 ASSAY OF TOTAL ESTRADIOL: CPT

## 2024-04-05 PROCEDURE — 84144 ASSAY OF PROGESTERONE: CPT

## 2024-04-05 PROCEDURE — 99213 OFFICE O/P EST LOW 20 MIN: CPT | Mod: 25

## 2024-04-07 ENCOUNTER — APPOINTMENT (OUTPATIENT)
Dept: HUMAN REPRODUCTION | Facility: CLINIC | Age: 34
End: 2024-04-07
Payer: COMMERCIAL

## 2024-04-07 PROCEDURE — 36415 COLL VENOUS BLD VENIPUNCTURE: CPT

## 2024-04-07 PROCEDURE — 76857 US EXAM PELVIC LIMITED: CPT

## 2024-04-07 PROCEDURE — 99213 OFFICE O/P EST LOW 20 MIN: CPT | Mod: 25

## 2024-04-09 ENCOUNTER — APPOINTMENT (OUTPATIENT)
Dept: HUMAN REPRODUCTION | Facility: CLINIC | Age: 34
End: 2024-04-09
Payer: COMMERCIAL

## 2024-04-09 PROCEDURE — 36415 COLL VENOUS BLD VENIPUNCTURE: CPT

## 2024-04-09 PROCEDURE — 99213 OFFICE O/P EST LOW 20 MIN: CPT | Mod: 25

## 2024-04-09 PROCEDURE — 82670 ASSAY OF TOTAL ESTRADIOL: CPT

## 2024-04-09 PROCEDURE — 84144 ASSAY OF PROGESTERONE: CPT

## 2024-04-09 PROCEDURE — 76857 US EXAM PELVIC LIMITED: CPT

## 2024-04-09 PROCEDURE — 83002 ASSAY OF GONADOTROPIN (LH): CPT | Mod: QW

## 2024-04-10 ENCOUNTER — APPOINTMENT (OUTPATIENT)
Dept: HUMAN REPRODUCTION | Facility: CLINIC | Age: 34
End: 2024-04-10
Payer: COMMERCIAL

## 2024-04-10 ENCOUNTER — APPOINTMENT (OUTPATIENT)
Dept: HUMAN REPRODUCTION | Facility: CLINIC | Age: 34
End: 2024-04-10

## 2024-04-10 PROCEDURE — 36415 COLL VENOUS BLD VENIPUNCTURE: CPT

## 2024-04-10 PROCEDURE — 99213 OFFICE O/P EST LOW 20 MIN: CPT | Mod: 25

## 2024-04-10 PROCEDURE — 99459 PELVIC EXAMINATION: CPT

## 2024-04-10 PROCEDURE — 82670 ASSAY OF TOTAL ESTRADIOL: CPT

## 2024-04-10 PROCEDURE — 76857 US EXAM PELVIC LIMITED: CPT

## 2024-04-10 PROCEDURE — 84144 ASSAY OF PROGESTERONE: CPT

## 2024-04-10 PROCEDURE — 83002 ASSAY OF GONADOTROPIN (LH): CPT | Mod: QW

## 2024-04-11 ENCOUNTER — APPOINTMENT (OUTPATIENT)
Dept: HUMAN REPRODUCTION | Facility: CLINIC | Age: 34
End: 2024-04-11
Payer: COMMERCIAL

## 2024-04-11 PROCEDURE — 36415 COLL VENOUS BLD VENIPUNCTURE: CPT

## 2024-04-11 PROCEDURE — 76857 US EXAM PELVIC LIMITED: CPT

## 2024-04-11 PROCEDURE — 83002 ASSAY OF GONADOTROPIN (LH): CPT | Mod: QW

## 2024-04-11 PROCEDURE — 82670 ASSAY OF TOTAL ESTRADIOL: CPT

## 2024-04-11 PROCEDURE — 84144 ASSAY OF PROGESTERONE: CPT

## 2024-04-11 PROCEDURE — 99213 OFFICE O/P EST LOW 20 MIN: CPT | Mod: 25

## 2024-04-12 ENCOUNTER — APPOINTMENT (OUTPATIENT)
Dept: HUMAN REPRODUCTION | Facility: CLINIC | Age: 34
End: 2024-04-12
Payer: COMMERCIAL

## 2024-04-12 PROCEDURE — 83002 ASSAY OF GONADOTROPIN (LH): CPT | Mod: QW

## 2024-04-12 PROCEDURE — 82670 ASSAY OF TOTAL ESTRADIOL: CPT

## 2024-04-12 PROCEDURE — 76857 US EXAM PELVIC LIMITED: CPT

## 2024-04-12 PROCEDURE — 99213 OFFICE O/P EST LOW 20 MIN: CPT | Mod: 25

## 2024-04-12 PROCEDURE — 36415 COLL VENOUS BLD VENIPUNCTURE: CPT

## 2024-04-12 PROCEDURE — 84144 ASSAY OF PROGESTERONE: CPT

## 2024-04-14 ENCOUNTER — APPOINTMENT (OUTPATIENT)
Dept: HUMAN REPRODUCTION | Facility: CLINIC | Age: 34
End: 2024-04-14
Payer: COMMERCIAL

## 2024-04-14 PROCEDURE — 36415 COLL VENOUS BLD VENIPUNCTURE: CPT

## 2024-04-14 PROCEDURE — 76857 US EXAM PELVIC LIMITED: CPT

## 2024-04-14 PROCEDURE — 99213 OFFICE O/P EST LOW 20 MIN: CPT | Mod: 25

## 2024-04-15 ENCOUNTER — APPOINTMENT (OUTPATIENT)
Dept: HUMAN REPRODUCTION | Facility: CLINIC | Age: 34
End: 2024-04-15
Payer: COMMERCIAL

## 2024-04-15 PROCEDURE — 84144 ASSAY OF PROGESTERONE: CPT

## 2024-04-15 PROCEDURE — 83002 ASSAY OF GONADOTROPIN (LH): CPT | Mod: QW

## 2024-04-15 PROCEDURE — 82670 ASSAY OF TOTAL ESTRADIOL: CPT

## 2024-04-15 PROCEDURE — 36415 COLL VENOUS BLD VENIPUNCTURE: CPT

## 2024-04-16 ENCOUNTER — APPOINTMENT (OUTPATIENT)
Dept: HUMAN REPRODUCTION | Facility: CLINIC | Age: 34
End: 2024-04-16
Payer: COMMERCIAL

## 2024-04-16 PROCEDURE — 89254 OOCYTE IDENTIFICATION: CPT

## 2024-04-16 PROCEDURE — 89250 CULTR OOCYTE/EMBRYO <4 DAYS: CPT

## 2024-04-16 PROCEDURE — 58970 RETRIEVAL OF OOCYTE: CPT

## 2024-04-16 PROCEDURE — 76948 ECHO GUIDE OVA ASPIRATION: CPT

## 2024-04-16 PROCEDURE — 89261 SPERM ISOLATION COMPLEX: CPT

## 2024-04-16 PROCEDURE — 89268 INSEMINATION OF OOCYTES: CPT

## 2024-04-17 ENCOUNTER — APPOINTMENT (OUTPATIENT)
Dept: HUMAN REPRODUCTION | Facility: CLINIC | Age: 34
End: 2024-04-17
Payer: COMMERCIAL

## 2024-04-20 PROCEDURE — 89253 EMBRYO HATCHING: CPT

## 2024-04-21 PROCEDURE — 89258 CRYOPRESERVATION EMBRYO(S): CPT

## 2024-04-21 PROCEDURE — 89272 EXTENDED CULTURE OF OOCYTES: CPT

## 2024-04-21 PROCEDURE — 89290 BIOPSY OOCYTE POLAR BODY <=5: CPT

## 2024-04-21 PROCEDURE — 89342 STORAGE/YEAR EMBRYO(S): CPT

## 2024-04-22 ENCOUNTER — TRANSCRIPTION ENCOUNTER (OUTPATIENT)
Age: 34
End: 2024-04-22

## 2024-04-22 PROCEDURE — 89291 BIOPSY OOCYTE POLAR BODY: CPT

## 2024-04-22 PROCEDURE — 89258 CRYOPRESERVATION EMBRYO(S): CPT

## 2024-04-29 ENCOUNTER — APPOINTMENT (OUTPATIENT)
Dept: HUMAN REPRODUCTION | Facility: CLINIC | Age: 34
End: 2024-04-29

## 2024-04-29 PROCEDURE — 36415 COLL VENOUS BLD VENIPUNCTURE: CPT

## 2024-04-29 PROCEDURE — 84702 CHORIONIC GONADOTROPIN TEST: CPT

## 2024-04-30 ENCOUNTER — APPOINTMENT (OUTPATIENT)
Dept: HUMAN REPRODUCTION | Facility: CLINIC | Age: 34
End: 2024-04-30
Payer: COMMERCIAL

## 2024-04-30 PROCEDURE — 58340 CATHETER FOR HYSTEROGRAPHY: CPT

## 2024-04-30 PROCEDURE — 58999I: CUSTOM

## 2024-04-30 PROCEDURE — 76831 ECHO EXAM UTERUS: CPT

## 2024-05-03 ENCOUNTER — APPOINTMENT (OUTPATIENT)
Dept: HUMAN REPRODUCTION | Facility: CLINIC | Age: 34
End: 2024-05-03
Payer: COMMERCIAL

## 2024-05-03 PROCEDURE — 76998 US GUIDE INTRAOP: CPT

## 2024-05-03 PROCEDURE — 58558Z: CUSTOM

## 2024-05-16 ENCOUNTER — APPOINTMENT (OUTPATIENT)
Dept: HUMAN REPRODUCTION | Facility: CLINIC | Age: 34
End: 2024-05-16
Payer: COMMERCIAL

## 2024-05-16 PROCEDURE — 99214 OFFICE O/P EST MOD 30 MIN: CPT

## 2024-05-17 ENCOUNTER — APPOINTMENT (OUTPATIENT)
Dept: HUMAN REPRODUCTION | Facility: CLINIC | Age: 34
End: 2024-05-17

## 2024-05-17 PROCEDURE — 83001 ASSAY OF GONADOTROPIN (FSH): CPT | Mod: QW

## 2024-05-17 PROCEDURE — 36415 COLL VENOUS BLD VENIPUNCTURE: CPT

## 2024-05-17 PROCEDURE — 82670 ASSAY OF TOTAL ESTRADIOL: CPT

## 2024-05-17 PROCEDURE — 84702 CHORIONIC GONADOTROPIN TEST: CPT

## 2024-05-17 PROCEDURE — 83002 ASSAY OF GONADOTROPIN (LH): CPT | Mod: QW

## 2024-05-17 PROCEDURE — 84144 ASSAY OF PROGESTERONE: CPT

## 2024-05-23 ENCOUNTER — APPOINTMENT (OUTPATIENT)
Dept: HUMAN REPRODUCTION | Facility: CLINIC | Age: 34
End: 2024-05-23
Payer: COMMERCIAL

## 2024-05-23 PROCEDURE — 99459 PELVIC EXAMINATION: CPT

## 2024-05-23 PROCEDURE — 84144 ASSAY OF PROGESTERONE: CPT

## 2024-05-23 PROCEDURE — 36415 COLL VENOUS BLD VENIPUNCTURE: CPT

## 2024-05-23 PROCEDURE — 99213 OFFICE O/P EST LOW 20 MIN: CPT | Mod: 25

## 2024-05-23 PROCEDURE — 83002 ASSAY OF GONADOTROPIN (LH): CPT | Mod: QW

## 2024-05-23 PROCEDURE — 82670 ASSAY OF TOTAL ESTRADIOL: CPT

## 2024-05-23 PROCEDURE — 76857 US EXAM PELVIC LIMITED: CPT

## 2024-05-29 ENCOUNTER — APPOINTMENT (OUTPATIENT)
Dept: HUMAN REPRODUCTION | Facility: CLINIC | Age: 34
End: 2024-05-29
Payer: COMMERCIAL

## 2024-05-29 PROCEDURE — 82670 ASSAY OF TOTAL ESTRADIOL: CPT

## 2024-05-29 PROCEDURE — 99213 OFFICE O/P EST LOW 20 MIN: CPT | Mod: 25

## 2024-05-29 PROCEDURE — 83002 ASSAY OF GONADOTROPIN (LH): CPT | Mod: QW

## 2024-05-29 PROCEDURE — 84144 ASSAY OF PROGESTERONE: CPT

## 2024-05-29 PROCEDURE — 36415 COLL VENOUS BLD VENIPUNCTURE: CPT

## 2024-05-29 PROCEDURE — 76857 US EXAM PELVIC LIMITED: CPT

## 2024-06-05 ENCOUNTER — APPOINTMENT (OUTPATIENT)
Dept: HUMAN REPRODUCTION | Facility: CLINIC | Age: 34
End: 2024-06-05
Payer: COMMERCIAL

## 2024-06-05 PROCEDURE — 76998 US GUIDE INTRAOP: CPT

## 2024-06-05 PROCEDURE — 89255 PREPARE EMBRYO FOR TRANSFER: CPT

## 2024-06-05 PROCEDURE — 36415 COLL VENOUS BLD VENIPUNCTURE: CPT

## 2024-06-05 PROCEDURE — 89398A: CUSTOM

## 2024-06-05 PROCEDURE — 58974 EMBRYO TRANSFER INTRAUTERINE: CPT

## 2024-06-05 PROCEDURE — 89352 THAWING CRYOPRESRVED EMBRYO: CPT

## 2024-06-05 PROCEDURE — 84144 ASSAY OF PROGESTERONE: CPT

## 2024-06-14 ENCOUNTER — APPOINTMENT (OUTPATIENT)
Dept: HUMAN REPRODUCTION | Facility: CLINIC | Age: 34
End: 2024-06-14

## 2024-06-14 PROCEDURE — 36415 COLL VENOUS BLD VENIPUNCTURE: CPT

## 2024-06-14 PROCEDURE — 84702 CHORIONIC GONADOTROPIN TEST: CPT

## 2024-06-14 PROCEDURE — 84144 ASSAY OF PROGESTERONE: CPT

## 2024-06-17 ENCOUNTER — APPOINTMENT (OUTPATIENT)
Dept: HUMAN REPRODUCTION | Facility: CLINIC | Age: 34
End: 2024-06-17

## 2024-06-17 PROCEDURE — 36415 COLL VENOUS BLD VENIPUNCTURE: CPT

## 2024-06-17 PROCEDURE — 84144 ASSAY OF PROGESTERONE: CPT

## 2024-06-17 PROCEDURE — 84702 CHORIONIC GONADOTROPIN TEST: CPT

## 2024-06-19 ENCOUNTER — APPOINTMENT (OUTPATIENT)
Dept: HUMAN REPRODUCTION | Facility: CLINIC | Age: 34
End: 2024-06-19

## 2024-06-19 PROCEDURE — 36415 COLL VENOUS BLD VENIPUNCTURE: CPT

## 2024-06-19 PROCEDURE — 84144 ASSAY OF PROGESTERONE: CPT

## 2024-06-19 PROCEDURE — 84702 CHORIONIC GONADOTROPIN TEST: CPT

## 2024-06-25 ENCOUNTER — APPOINTMENT (OUTPATIENT)
Dept: HUMAN REPRODUCTION | Facility: CLINIC | Age: 34
End: 2024-06-25

## 2024-06-25 ENCOUNTER — APPOINTMENT (OUTPATIENT)
Dept: HUMAN REPRODUCTION | Facility: CLINIC | Age: 34
End: 2024-06-25
Payer: COMMERCIAL

## 2024-06-25 PROCEDURE — 76817 TRANSVAGINAL US OBSTETRIC: CPT

## 2024-06-25 PROCEDURE — 99459 PELVIC EXAMINATION: CPT

## 2024-06-25 PROCEDURE — 84144 ASSAY OF PROGESTERONE: CPT

## 2024-06-25 PROCEDURE — 84702 CHORIONIC GONADOTROPIN TEST: CPT

## 2024-06-25 PROCEDURE — 36415 COLL VENOUS BLD VENIPUNCTURE: CPT

## 2024-06-25 PROCEDURE — 99213 OFFICE O/P EST LOW 20 MIN: CPT | Mod: 25

## 2024-07-03 ENCOUNTER — APPOINTMENT (OUTPATIENT)
Dept: HUMAN REPRODUCTION | Facility: CLINIC | Age: 34
End: 2024-07-03
Payer: COMMERCIAL

## 2024-07-03 PROCEDURE — 76817 TRANSVAGINAL US OBSTETRIC: CPT

## 2024-07-03 PROCEDURE — 99459 PELVIC EXAMINATION: CPT

## 2024-07-03 PROCEDURE — 99213 OFFICE O/P EST LOW 20 MIN: CPT | Mod: 25

## 2024-07-10 ENCOUNTER — APPOINTMENT (OUTPATIENT)
Dept: HUMAN REPRODUCTION | Facility: CLINIC | Age: 34
End: 2024-07-10
Payer: COMMERCIAL

## 2024-07-10 PROCEDURE — 76817 TRANSVAGINAL US OBSTETRIC: CPT

## 2024-07-10 PROCEDURE — 99213 OFFICE O/P EST LOW 20 MIN: CPT | Mod: 25

## 2024-07-18 ENCOUNTER — APPOINTMENT (OUTPATIENT)
Dept: HUMAN REPRODUCTION | Facility: CLINIC | Age: 34
End: 2024-07-18
Payer: COMMERCIAL

## 2024-07-18 PROCEDURE — 76817 TRANSVAGINAL US OBSTETRIC: CPT

## 2024-07-18 PROCEDURE — 99213 OFFICE O/P EST LOW 20 MIN: CPT | Mod: 25

## 2024-08-16 ENCOUNTER — APPOINTMENT (OUTPATIENT)
Dept: ANTEPARTUM | Facility: CLINIC | Age: 34
End: 2024-08-16
Payer: COMMERCIAL

## 2024-08-16 ENCOUNTER — ASOB RESULT (OUTPATIENT)
Age: 34
End: 2024-08-16

## 2024-08-16 PROCEDURE — 76813 OB US NUCHAL MEAS 1 GEST: CPT

## 2024-08-16 PROCEDURE — 93976 VASCULAR STUDY: CPT

## 2024-10-11 ENCOUNTER — ASOB RESULT (OUTPATIENT)
Age: 34
End: 2024-10-11

## 2024-10-11 ENCOUNTER — APPOINTMENT (OUTPATIENT)
Dept: ANTEPARTUM | Facility: CLINIC | Age: 34
End: 2024-10-11
Payer: COMMERCIAL

## 2024-10-11 PROCEDURE — 76811 OB US DETAILED SNGL FETUS: CPT

## 2024-10-11 PROCEDURE — 76817 TRANSVAGINAL US OBSTETRIC: CPT

## 2024-12-06 ENCOUNTER — ASOB RESULT (OUTPATIENT)
Age: 34
End: 2024-12-06

## 2024-12-06 ENCOUNTER — APPOINTMENT (OUTPATIENT)
Dept: ANTEPARTUM | Facility: CLINIC | Age: 34
End: 2024-12-06
Payer: COMMERCIAL

## 2024-12-06 PROCEDURE — 76819 FETAL BIOPHYS PROFIL W/O NST: CPT | Mod: 59

## 2024-12-06 PROCEDURE — 76816 OB US FOLLOW-UP PER FETUS: CPT

## 2024-12-06 PROCEDURE — 76820 UMBILICAL ARTERY ECHO: CPT | Mod: 59

## 2024-12-06 PROCEDURE — 76817 TRANSVAGINAL US OBSTETRIC: CPT

## 2025-01-02 ENCOUNTER — ASOB RESULT (OUTPATIENT)
Age: 35
End: 2025-01-02

## 2025-01-02 ENCOUNTER — APPOINTMENT (OUTPATIENT)
Dept: ANTEPARTUM | Facility: CLINIC | Age: 35
End: 2025-01-02
Payer: COMMERCIAL

## 2025-01-02 PROCEDURE — 76816 OB US FOLLOW-UP PER FETUS: CPT

## 2025-01-02 PROCEDURE — 76820 UMBILICAL ARTERY ECHO: CPT | Mod: 59

## 2025-01-02 PROCEDURE — 76819 FETAL BIOPHYS PROFIL W/O NST: CPT | Mod: 59

## 2025-01-02 PROCEDURE — 76817 TRANSVAGINAL US OBSTETRIC: CPT

## 2025-01-09 ENCOUNTER — OUTPATIENT (OUTPATIENT)
Dept: OUTPATIENT SERVICES | Facility: HOSPITAL | Age: 35
LOS: 1 days | End: 2025-01-09
Payer: COMMERCIAL

## 2025-01-09 VITALS
OXYGEN SATURATION: 98 % | HEART RATE: 110 BPM | SYSTOLIC BLOOD PRESSURE: 126 MMHG | TEMPERATURE: 98 F | DIASTOLIC BLOOD PRESSURE: 83 MMHG | RESPIRATION RATE: 18 BRPM

## 2025-01-09 DIAGNOSIS — O26.899 OTHER SPECIFIED PREGNANCY RELATED CONDITIONS, UNSPECIFIED TRIMESTER: ICD-10-CM

## 2025-01-09 PROCEDURE — 96372 THER/PROPH/DIAG INJ SC/IM: CPT

## 2025-01-09 PROCEDURE — 99214 OFFICE O/P EST MOD 30 MIN: CPT

## 2025-01-09 RX ORDER — BETAMETHASONE ACETATE,SOD PHOS 6 MG/ML
12 VIAL (ML) INJECTION ONCE
Refills: 0 | Status: COMPLETED | OUTPATIENT
Start: 2025-01-09 | End: 2025-01-09

## 2025-01-09 RX ADMIN — Medication 12 MILLIGRAM(S): at 16:29

## 2025-01-09 NOTE — OB PROVIDER TRIAGE NOTE - HISTORY OF PRESENT ILLNESS
Subjective:  HPI: 34y Female  at 33w5d presents for scheduled  corticosteroids for fetal lung maturity in the setting of know vasa previa. Per MFM ultrasound : the closed cervical canal length measures 2.94 cm without funneling, the previously noted vasa previa is again noted.  - Fetal movement: endorses  - Contractions: endorses, nonpainful, irregular every 30 minutes  - Leakage of fluid: denies  - Vaginal bleeding: denies    Antepartum:  - Pregnancy type: IVF, own egg  - Testing: NIPT wnl  - Anatomy scan: wnl  - GCT/GTT: passed  - Hypertensive disorders of pregnancy: denies  - GBS status: unknown  - EFW: 1988g on     - ObHx: G1 current  - GynHx: denies  - PMH: denies  - PSH: denies  - SH: denies toxic habits  - Meds: Prenatal vitamins  - Allergies: No Known Allergies          Objective:    T(C): 36.7 (25 @ 15:16), Max: 36.7 (25 @ 15:16)  HR: 110 (25 @ 15:16) (110 - 110)  BP: 126/83 (25 @ 15:16) (126/83 - 126/83)  RR: 18 (25 @ 15:16) (18 - 18)  SpO2: 98% (25 @ 15:16) (98% - 98%)    General: No apparent distress; Lying comfortably in bed  Pulmonary: No increased work of breathing  Abdomen: Soft; Nontender; Gravid  Extremities: Trace pedal edema bilaterally; No calf tenderness bilaterally  Neurological: Gross movements intact  Psychiatric: Appropriate mood and affect  Skin: No rashes or jaundice    SVE: deferred  TAUS: cephalic fetus; anterior placenta; BPP 8/8; ALEX 14.3cm (sonogram attached)  FHT: baseline 135; moderate variability; +accels; possible late decel once x1 min with discontinuity in tracing  TOCO: irregular contractions 1 in 10min           Assessment:  33 yo  at 33w5d presents for scheduled corticosteroid injection for fetal lung maturity due to known vasa previa. Overall, maternal and fetal statuses reassuring at this time, but possible late decel noted on EFM, so will continue to monitoring.      Plan:  - Continuous EFM with TOCO  - Will plan for dose 2 BMZ in 24 hours  - Scheduled admission beginning 34w EGA  - Scheduled CS at 36w EGA  - If overall reassuring tracing, will consider discharge home  - All questions were answered and concerns addressed. Patient verbally expressed understanding.  - Discussed with senior resident Dr. Galvan  - Discussed with attending physician Dr. Spears

## 2025-01-09 NOTE — OB RN TRIAGE NOTE - FALL HARM RISK - UNIVERSAL INTERVENTIONS
Bed in lowest position, wheels locked, appropriate side rails in place/Call bell, personal items and telephone in reach/Instruct patient to call for assistance before getting out of bed or chair/Non-slip footwear when patient is out of bed/[place] to call system/Physically safe environment - no spills, clutter or unnecessary equipment/Purposeful Proactive Rounding/Room/bathroom lighting operational, light cord in reach

## 2025-01-10 ENCOUNTER — OUTPATIENT (OUTPATIENT)
Dept: OUTPATIENT SERVICES | Facility: HOSPITAL | Age: 35
LOS: 1 days | End: 2025-01-10
Payer: COMMERCIAL

## 2025-01-10 VITALS
SYSTOLIC BLOOD PRESSURE: 121 MMHG | DIASTOLIC BLOOD PRESSURE: 80 MMHG | RESPIRATION RATE: 18 BRPM | OXYGEN SATURATION: 99 % | HEART RATE: 107 BPM | TEMPERATURE: 98 F

## 2025-01-10 DIAGNOSIS — O26.899 OTHER SPECIFIED PREGNANCY RELATED CONDITIONS, UNSPECIFIED TRIMESTER: ICD-10-CM

## 2025-01-10 PROCEDURE — 59025 FETAL NON-STRESS TEST: CPT

## 2025-01-10 PROCEDURE — 99214 OFFICE O/P EST MOD 30 MIN: CPT

## 2025-01-10 PROCEDURE — 96372 THER/PROPH/DIAG INJ SC/IM: CPT

## 2025-01-10 RX ORDER — RESPIRATORY SYNCYTIAL VIRUS VACCINE 120MCG/0.5
0.5 KIT INTRAMUSCULAR ONCE
Refills: 0 | Status: ACTIVE | OUTPATIENT
Start: 2025-01-10

## 2025-01-10 RX ORDER — BETAMETHASONE ACETATE,SOD PHOS 6 MG/ML
12 VIAL (ML) INJECTION ONCE
Refills: 0 | Status: ACTIVE | OUTPATIENT
Start: 2025-01-10 | End: 2025-01-10

## 2025-01-10 RX ORDER — BETAMETHASONE ACETATE,SOD PHOS 6 MG/ML
12 VIAL (ML) INJECTION ONCE
Refills: 0 | Status: COMPLETED | OUTPATIENT
Start: 2025-01-10 | End: 2025-01-10

## 2025-01-10 RX ADMIN — Medication 12 MILLIGRAM(S): at 16:25

## 2025-01-10 NOTE — OB RN TRIAGE NOTE - TOBACCO USE
Chief Complaint:  Heidy Vega is a 71 year old female presenting today for Office Visit (Requesting to restart Clonazepam), Follow-up, and Anxiety  .    History of Present Illness:  Here for follow up.     Was seen in UC due to L shoulder pain that was radiating to her head.   Given tizanidine and tylenol, has noticed some improvement in her pain.     Anxiety: has noticed some increased anxiety attacks and muscle tension due to stress. Has taken SSRI in the past with bad side effects in her mood, was previously on clonazepam.     Review of Systems   Constitutional:  Negative for chills and fever.   HENT:  Negative for congestion.    Respiratory:  Negative for chest tightness and shortness of breath.    Cardiovascular:  Negative for chest pain and leg swelling.   Gastrointestinal:  Negative for constipation, diarrhea, nausea and vomiting.   Genitourinary:  Negative for difficulty urinating.   Neurological:  Negative for numbness and headaches.       Patient Active Problem List   Diagnosis    Anxiety disorder    History of colonoscopy-Recall 10/2027    Age related osteoporosis    Other seborrheic keratosis    Allergic rhinitis    Atrophic vaginitis    Uterine prolapse    Gastroesophageal reflux disease with esophagitis    Left posterior communicating artery aneurysm    Incomplete bladder emptying    Primary osteoarthritis of both knees       Medications:  Current Outpatient Medications   Medication Sig    Cyanocobalamin (VITAMIN B 12 PO)     Multiple Vitamins-Minerals (ZINC PO)     busPIRone (BUSPAR) 5 MG tablet Take 1 tablet by mouth 2 times daily.    clonazePAM (KlonoPIN) 0.5 MG tablet Take 1 tablet by mouth 2 times daily as needed for Anxiety.    tiZANidine (ZANAFLEX) 2 MG tablet Take 1 tablet by mouth every 8 hours as needed for Muscle spasms.    acetaminophen (TYLENOL) 500 MG tablet Take 2 tablets by mouth every 8 hours.    Ascorbic Acid (VITAMIN C PO)     VITAMIN E PO     clobetasol emollient base (Clobetasol  Propionate E) 0.05 % cream Apply topically daily as needed (irritation). (Patient not taking: Reported on 10/8/2024)    Calcium Citrate 333 MG Tab Take 333 mg by mouth 3 times daily. (Patient taking differently: Take 333 mg by mouth 2 (two) times a day.)    Calcium-Magnesium-Vitamin D (CALCIUM 500 PO) Take 1 tablet by mouth daily.     No current facility-administered medications for this visit.       Vitals:    10/08/24 1301   BP: 118/74   Pulse: 67   Resp: 18   Temp: 98.8 °F (37.1 °C)   TempSrc: Oral   SpO2: 97%   Weight: 78 kg (171 lb 15.3 oz)   Height: 5' 3\" (1.6 m)       Physical Exam  Vitals reviewed.   Constitutional:       Appearance: Normal appearance.   HENT:      Head: Normocephalic.      Neck: Normal range of motion.   Eyes:      Extraocular Movements: Extraocular movements intact.      Conjunctiva/sclera: Conjunctivae normal.      Pupils: Pupils are equal, round, and reactive to light.   Cardiovascular:      Rate and Rhythm: Normal rate and regular rhythm.      Heart sounds: Normal heart sounds.   Pulmonary:      Effort: No respiratory distress.      Breath sounds: No wheezing or rhonchi.   Abdominal:      Palpations: Abdomen is soft.      Tenderness: There is no abdominal tenderness. There is no guarding or rebound.   Musculoskeletal:         General: No swelling, tenderness or deformity.   Skin:     General: Skin is dry.      Findings: No rash.   Neurological:      Mental Status: She is alert and oriented to person, place, and time.      Sensory: No sensory deficit.      Gait: Gait normal.   Psychiatric:         Mood and Affect: Mood normal.         Behavior: Behavior normal.         ASSESSMENT/PLAN  Problem List Items Addressed This Visit          Mental Health    Anxiety disorder - Primary    Relevant Medications    busPIRone (BUSPAR) 5 MG tablet    clonazePAM (KlonoPIN) 0.5 MG tablet     Other Visit Diagnoses       Trapezius muscle spasm              Reviewed prior notes  and Reviewed results of  previous labs  Patient here for follow-up on anxiety, feeling increased severity and frequency of panic attacks.  Discussed using clonazepam as needed, will send for BuSpar to help prevent panic attacks.  Still having some muscle tightness, encouraged heat, stretching, increasing physical activity as tolerated  Return in about 3 months (around 1/8/2025).    Medical compliance with plan discussed and risks of non-compliance reviewed. Patient expresses understanding of the plan. Proper usage and side effects of medications reviewed & discussed.    Dino Cordon MD   Never smoker

## 2025-01-10 NOTE — OB RN TRIAGE NOTE - NSICDXNOPASTSURGICALHX_GEN_ALL_CORE
Ochsner Comment: [employer] [address]   Social Needs    Financial resource strain: Not on file    Food insecurity     Worry: Not on file     Inability: Not on file    Transportation needs     Medical: Not on file     Non-medical: Not on file   Tobacco Use    Smoking status: Never Smoker    Smokeless tobacco: Never Used   Substance and Sexual Activity    Alcohol use: Yes     Alcohol/week: 0.0 standard drinks     Comment: occasional    Drug use: No    Sexual activity: Yes     Partners: Male     Birth control/protection: I.U.D.   Lifestyle    Physical activity     Days per week: Not on file     Minutes per session: Not on file    Stress: Not on file   Relationships    Social connections     Talks on phone: Not on file     Gets together: Not on file     Attends Zoroastrianism service: Not on file     Active member of club or organization: Not on file     Attends meetings of clubs or organizations: Not on file     Relationship status: Not on file   Other Topics Concern    Not on file   Social History Narrative    The patient does exercise regularly (resistance, jogging: almost daily).   Rates diet as good.  She is satisfied with weight.    She is over all court programs for [employer] - her service dog is Savannah, 5/5   Hamstrin/5   Gastrocsoleus:  5/5/5  Left Lower Extremity   Quadriceps:  5/5   Hamstrin/5   Gastrocsoleus:  5/5/5      transcription errors.   <-- Click to add NO significant Past Surgical History

## 2025-01-10 NOTE — OB PROVIDER TRIAGE NOTE - HISTORY OF PRESENT ILLNESS
Subjective:  HPI: 34y Female  at 33w6d presents for scheduled  corticosteroids for fetal lung maturity in the setting of know vasa previa. Per [facility] ultrasound : the closed cervical canal length measures 2.94 cm without funneling, the previously noted vasa previa is again noted. BMZ dose 1 given at 1630 yesterday.  - Fetal movement: endorses  - Contractions: endorses, nonpainful, irregular every 30 minutes  - Leakage of fluid: denies  - Vaginal bleeding: denies    Antepartum:  - Pregnancy type: IVF, own egg  - Testing: NIPT wnl  - Anatomy scan: wnl  - GCT/GTT: passed  - Hypertensive disorders of pregnancy: denies  - GBS status: unknown  - EFW: g on     - ObHx: G1 current  - GynHx: denies  - PMH: denies  - PSH: denies  - SH: denies toxic habits  - Meds: Prenatal vitamins  - Allergies: No Known Allergies      Objective:    T(C): --  HR: --  BP: --  RR: --  SpO2: --    General: No apparent distress; Lying comfortably in bed  Pulmonary: No increased work of breathing  Abdomen: Soft; Nontender; Gravid  Extremities: Trace pedal edema bilaterally; No calf tenderness bilaterally  Neurological: Gross movements intact  Psychiatric: Appropriate mood and affect  Skin: No rashes or jaundice    FHT: baseline 130;  moderate variability; +accels; -decels; reactive and reassuring tracing  TOCO: isolated contraction x1; otherwise quiet          Assessment:  33 yo  at 33w6d presents for scheduled corticosteroid injection for fetal lung maturity due to known vasa previa. Overall, maternal and fetal statuses reassuring at this time.    Plan:  - Scheduled admission beginning 34w EGA  - Scheduled CS at 36w EGA  - All questions were answered and concerns addressed. Patient verbally expressed understanding.  - Discussed with senior resident Dr. Puente  - Discussed with attending physician Dr. Spears Subjective:  HPI: 34y Female  at 33w6d presents for scheduled  corticosteroids for fetal lung maturity in the setting of know vasa previa. Per [facility] ultrasound : the closed cervical canal length measures 2.94 cm without funneling, the previously noted vasa previa is again noted. BMZ dose 1 given at 1630 yesterday.  - Fetal movement: endorses  - Contractions: endorses, nonpainful, irregular every 30 minutes  - Leakage of fluid: denies  - Vaginal bleeding: denies    Antepartum:  - Pregnancy type: IVF, own egg  - Testing: NIPT wnl  - Anatomy scan: wnl  - GCT/GTT: passed  - Hypertensive disorders of pregnancy: denies  - GBS status: unknown  - EFW: 1988g on     - ObHx: G1 current  - GynHx: denies  - PMH: denies  - PSH: denies  - SH: denies toxic habits  - Meds: Prenatal vitamins  - Allergies: No Known Allergies      Objective:    T(C): --  HR: --  BP: --  RR: --  SpO2: --    General: No apparent distress; Lying comfortably in bed  Pulmonary: No increased work of breathing  Abdomen: Soft; Nontender; Gravid  Extremities: Trace pedal edema bilaterally; No calf tenderness bilaterally  Neurological: Gross movements intact  Psychiatric: Appropriate mood and affect  Skin: No rashes or jaundice    FHT: baseline 130;  moderate variability; +accels; -decels; reactive and reassuring tracing  TOCO: isolated contraction x1; otherwise quiet          Assessment:  33 yo  at 33w6d presents for scheduled corticosteroid injection for fetal lung maturity due to known vasa previa. Overall, maternal and fetal statuses reassuring at this time. RSV vaccine given.    Plan:  - Discharge home in stable condition  - Reviewed  labor and bleeding precautions  - Scheduled admission beginning 34w EGA  - Scheduled CS at 36w EGA  - All questions were answered and concerns addressed. Patient verbally expressed understanding.  - Discussed with senior resident Dr. Puente  - Discussed with attending physician Dr. Spears

## 2025-01-13 ENCOUNTER — INPATIENT (INPATIENT)
Facility: HOSPITAL | Age: 35
LOS: 9 days | Discharge: HOME | End: 2025-01-23
Attending: OBSTETRICS & GYNECOLOGY | Admitting: OBSTETRICS & GYNECOLOGY
Payer: COMMERCIAL

## 2025-01-13 VITALS
SYSTOLIC BLOOD PRESSURE: 119 MMHG | DIASTOLIC BLOOD PRESSURE: 74 MMHG | OXYGEN SATURATION: 99 % | TEMPERATURE: 98 F | RESPIRATION RATE: 18 BRPM | WEIGHT: 151.02 LBS | HEIGHT: 62 IN | HEART RATE: 96 BPM

## 2025-01-13 DIAGNOSIS — Z3A.33 33 WEEKS GESTATION OF PREGNANCY: ICD-10-CM

## 2025-01-13 DIAGNOSIS — O09.813 SUPERVISION OF PREGNANCY RESULTING FROM ASSISTED REPRODUCTIVE TECHNOLOGY, THIRD TRIMESTER: ICD-10-CM

## 2025-01-13 DIAGNOSIS — O44.03 COMPLETE PLACENTA PREVIA NOS OR WITHOUT HEMORRHAGE, THIRD TRIMESTER: ICD-10-CM

## 2025-01-13 PROBLEM — Z78.9 OTHER SPECIFIED HEALTH STATUS: Chronic | Status: ACTIVE | Noted: 2025-01-09

## 2025-01-13 LAB
ALBUMIN SERPL ELPH-MCNC: 3.8 G/DL — SIGNIFICANT CHANGE UP (ref 3.3–5)
ALP SERPL-CCNC: 85 U/L — SIGNIFICANT CHANGE UP (ref 40–120)
ALT FLD-CCNC: 12 U/L — SIGNIFICANT CHANGE UP (ref 10–45)
ANION GAP SERPL CALC-SCNC: 10 MMOL/L — SIGNIFICANT CHANGE UP (ref 5–17)
APTT BLD: 26.1 SEC — SIGNIFICANT CHANGE UP (ref 24.5–35.6)
AST SERPL-CCNC: 17 U/L — SIGNIFICANT CHANGE UP (ref 10–40)
BILIRUB SERPL-MCNC: 0.3 MG/DL — SIGNIFICANT CHANGE UP (ref 0.2–1.2)
BLD GP AB SCN SERPL QL: NEGATIVE — SIGNIFICANT CHANGE UP
BUN SERPL-MCNC: 11 MG/DL — SIGNIFICANT CHANGE UP (ref 7–23)
CALCIUM SERPL-MCNC: 9 MG/DL — SIGNIFICANT CHANGE UP (ref 8.4–10.5)
CHLORIDE SERPL-SCNC: 98 MMOL/L — SIGNIFICANT CHANGE UP (ref 96–108)
CO2 SERPL-SCNC: 22 MMOL/L — SIGNIFICANT CHANGE UP (ref 22–31)
CREAT SERPL-MCNC: 0.62 MG/DL — SIGNIFICANT CHANGE UP (ref 0.5–1.3)
EGFR: 120 ML/MIN/1.73M2 — SIGNIFICANT CHANGE UP
FIBRINOGEN PPP-MCNC: 374 MG/DL — SIGNIFICANT CHANGE UP (ref 200–445)
GLUCOSE SERPL-MCNC: 99 MG/DL — SIGNIFICANT CHANGE UP (ref 70–99)
HCT VFR BLD CALC: 34.7 % — SIGNIFICANT CHANGE UP (ref 34.5–45)
HGB BLD-MCNC: 12 G/DL — SIGNIFICANT CHANGE UP (ref 11.5–15.5)
MCHC RBC-ENTMCNC: 32.3 PG — SIGNIFICANT CHANGE UP (ref 27–34)
MCHC RBC-ENTMCNC: 34.6 G/DL — SIGNIFICANT CHANGE UP (ref 32–36)
MCV RBC AUTO: 93.5 FL — SIGNIFICANT CHANGE UP (ref 80–100)
NRBC # BLD: 0 /100 WBCS — SIGNIFICANT CHANGE UP (ref 0–0)
NRBC BLD-RTO: 0 /100 WBCS — SIGNIFICANT CHANGE UP (ref 0–0)
PLATELET # BLD AUTO: 228 K/UL — SIGNIFICANT CHANGE UP (ref 150–400)
POTASSIUM SERPL-MCNC: 3.6 MMOL/L — SIGNIFICANT CHANGE UP (ref 3.5–5.3)
POTASSIUM SERPL-SCNC: 3.6 MMOL/L — SIGNIFICANT CHANGE UP (ref 3.5–5.3)
PROT SERPL-MCNC: 6.5 G/DL — SIGNIFICANT CHANGE UP (ref 6–8.3)
RBC # BLD: 3.71 M/UL — LOW (ref 3.8–5.2)
RBC # FLD: 13.9 % — SIGNIFICANT CHANGE UP (ref 10.3–14.5)
RH IG SCN BLD-IMP: POSITIVE — SIGNIFICANT CHANGE UP
RH IG SCN BLD-IMP: POSITIVE — SIGNIFICANT CHANGE UP
SODIUM SERPL-SCNC: 130 MMOL/L — LOW (ref 135–145)
URATE SERPL-MCNC: 3.4 MG/DL — SIGNIFICANT CHANGE UP (ref 2.5–7)
WBC # BLD: 9.47 K/UL — SIGNIFICANT CHANGE UP (ref 3.8–10.5)
WBC # FLD AUTO: 9.47 K/UL — SIGNIFICANT CHANGE UP (ref 3.8–10.5)

## 2025-01-13 RX ORDER — FERROUS SULFATE 325(65) MG
325 TABLET ORAL DAILY
Refills: 0 | Status: DISCONTINUED | OUTPATIENT
Start: 2025-01-13 | End: 2025-01-23

## 2025-01-13 RX ORDER — FOLIC ACID 1 MG
1 TABLET ORAL DAILY
Refills: 0 | Status: DISCONTINUED | OUTPATIENT
Start: 2025-01-13 | End: 2025-01-23

## 2025-01-13 RX ORDER — PNV WITH CALCIUM NO.11/IRON/FA 65 MG-1 MG
1 TABLET ORAL DAILY
Refills: 0 | Status: DISCONTINUED | OUTPATIENT
Start: 2025-01-13 | End: 2025-01-23

## 2025-01-13 RX ADMIN — Medication 1 MILLIGRAM(S): at 12:50

## 2025-01-13 RX ADMIN — Medication 1 TABLET(S): at 12:50

## 2025-01-13 RX ADMIN — Medication 325 MILLIGRAM(S): at 12:51

## 2025-01-13 NOTE — OB PROVIDER H&P - NS_LMP_OBGYN_ALL_OB_DT
CHIEF COMPLAINT:  No chief complaint on file.    HISTORY OF PRESENT ILLNESS  Ms. Bonner is a pleasant 79 year old year old female who presents to clinic today with right hip pain.  Carmen explains that right hip pain began during an 8 hour flight to [place] back in October- tailbone pain intermittent. Lives in [facility]. Hip pain intermittent- posterior hip, if she sits too long or is walking long distances.  Decreased strength on right side for steps. LBP off and on.     Onset: gradual, worsening  Location: right hip  Quality:  aching and dull  Duration: 5 months.  Severity: 4/10 at worst  Timing:intermittent episodes   Modifying factors:  resting and non-use makes it better, movement and use makes it worse  Associated signs & symptoms: pain  Previous similar pain:   Treatments to date: Tylenol, heat, muscle relaxer    Additional history: She wishes to travel again this summer and has a wedding to attend in June.  She is hoping that we can get control of this pain before then.    Review of Systems:  A 10-point review of systems was obtained and is negative except for as noted in the HPI.     MEDICAL HISTORY  Patient Active Problem List   Diagnosis     BMI 31     CARDIOVASCULAR SCREENING; LDL GOAL LESS THAN 130     HTN, goal below 140/90     Intermittent asthma, uncomplicated     GERD (gastroesophageal reflux disease)     Insomnia due to other mental disorder     Arthritis     Lumbar spondylosis     Upper back pain     Hypertension     Pain in thoracic spine     Stool incontinence     Advanced directives, counseling/discussion     Allergic rhinitis     Hyperlipidemia, unspecified hyperlipidemia type     History of paroxysmal supraventricular tachycardia     Generalized anxiety disorder     Anemia, unspecified type     Joint stiffness     Acute midline thoracic back pain     Physical deconditioning     Vitamin D deficiency     Benign essential hypertension     Primary insomnia     Osteopenia, unspecified  Patient behind on mammogram and DEXA will be ordered once above acute problems resolved he has had recent labs. She refuses any immunizations colonoscopy is now as per GI with her new diagnosis of colitis. location     Iron deficiency     Recurrent urinary tract infection       Current Outpatient Medications   Medication Sig Dispense Refill     acetaminophen (TYLENOL) 500 MG tablet Take 500-1,000 mg by mouth every 6 hours as needed for mild pain       albuterol (PROAIR HFA) 108 (90 Base) MCG/ACT inhaler Inhale 2 puffs into the lungs every 4 hours as needed for shortness of breath / dyspnea or wheezing 8.5 Inhaler 11     cholecalciferol 25 MCG (1000 UT) CHEW Take 1 chew tab by mouth daily       cloNIDine (CATAPRES) 0.2 MG tablet Take 1 tablet (0.2 mg) by mouth 2 times daily 180 tablet 1     conjugated estrogens (PREMARIN) 0.625 MG/GM vaginal cream Place 1 g vaginally twice a week 30 g 1     fluconazole (DIFLUCAN) 150 MG tablet Take 1 tablet (150 mg) by mouth once as needed 3 tablet 0     LANsoprazole (PREVACID) 30 MG DR capsule Take 1 capsule (30 mg) by mouth daily       lisinopril (ZESTRIL) 40 MG tablet TAKE 1 TABLET (40 MG) BY MOUTH DAILY FOR BLOOD PRESSURE 90 tablet 3     montelukast (SINGULAIR) 10 MG tablet Take 1 tablet (10 mg) by mouth At Bedtime 90 tablet 3     nystatin (MYCOSTATIN) 249791 UNIT/GM external powder Apply topically 2 times daily 60 g 0     QUEtiapine (SEROQUEL) 100 MG tablet Take 1.5 tablets (150 mg) by mouth At Bedtime for sleep 135 tablet 3     rosuvastatin (CRESTOR) 5 MG tablet Take 1 tablet (5 mg) by mouth daily for cholestrol 90 tablet 3     tiZANidine (ZANAFLEX) 2 MG tablet TAKE 1-2 TABLETS BY MOUTH DAILY AS NEEDED FOR MUSCLE SPASMS 180 tablet 1     triamcinolone (KENALOG) 0.1 % cream Apply topically 2 times daily 45 g 1     valACYclovir (VALTREX) 1000 mg tablet Take 1 tablet (1,000 mg) by mouth as needed Herpes Outbreak 12 tablet 1       Allergies   Allergen Reactions     Boniva [Ibandronic Acid]      Intolerance any stomach upset     Fosamax [Alendronic Acid]      Stomach upset     Hctz [Hydrochlorothiazide] Other (See Comments)     Low sodium     Lexapro [Escitalopram] Nausea      "Omeprazole Diarrhea     Sulfa Drugs Itching     Venlafaxine Nausea     Amlodipine Swelling     swelling of foot     Reclast [Zoledronic Acid]      Intolerance body aches       Family History   Problem Relation Age of Onset     Cancer Mother      Osteoporosis Mother      Cerebrovascular Disease Father      Alzheimer Disease Father      Genitourinary Problems Maternal Grandmother      Heart Disease Paternal Grandfather      Osteoporosis Sister      Arthritis Sister      Breast Cancer Paternal Grandmother      Colon Cancer No family hx of        Additional medical/Social/Surgical histories reviewed in [facility] and updated as appropriate.       PHYSICAL EXAM  Ht 1.575 m (5' 2\")   Wt 80.7 kg (178 lb)   BMI 32.56 kg/m      General  - normal appearance, in no obvious distress  CV  - normal peripheral perfusion  Pulm  - normal respiratory pattern, non-labored  Musculoskeletal - lumbar spine  - stance: normal gait without limp, no obvious leg length discrepancy, normal heel and toe walk  - inspection: normal bone and joint alignment, no obvious scoliosis  - palpation: no paravertebral or bony tenderness  - ROM: flexion exacerbates pain, normal extension, sidebending, rotation  - strength: lower extremities 5/5 in all planes  - special tests:  (-) straight leg raise  (-) slump test  Musculoskeletal - Right hip  - inspection: no swelling of anterolateral hip,  normal bone and joint alignment, no obvious deformity  - palpation: no lateral or anterior hip tenderness   - ROM: normal flexion, extension, IR, ER, abduction, adduction, not painful  - strength: 5/5 in all planes  - special tests:  (-) MISBAH  (-) FADIR  no pain with axial femoral load  Neuro  - No lower extremity sensory deficits, grossly normal coordination, normal muscle tone  Skin  - no ecchymosis, erythema, warmth, or induration, no obvious rash  Psych  - interactive, appropriate, normal mood and affect    IMAGING : XR lumbar 2 views and hip 2 views. Final results " and radiologist's interpretation, available in the [facility] health record. Images were reviewed with the patient/family members in the office today. My personal interpretation of the performed imaging is moderate to severe L5-S1 degenerative changes. No significant hip osteoarthritis.      ASSESSMENT & PLAN  Ms. Bonner is a 79 year old year old female who presents to clinic today with acute right sided buttock pain after prolonged trip flying to [place] 1+ years ago.     Suspected lumbar radiculitis affecting right hip posteriorly rather than muscular or intraarticular hip pathology. XR hip very reassuring.      Diagnosis: Chronic pain of right hip, Lumbar degenerative disc disease    Discussed although I cannot be certain that this pain is emanating from her lumbar spine, is most likely.  Consideration for MRI versus empiric treatment with physical therapy reviewed with Carmen.  She wishes to pursue physical therapy and had very good results as well as rapport with Kaylee Ball to see her back.  If they are not making significant progress, we then may consider a lumbar and +/- hip MRI.      Continue with heat/ice, Tylenol analgesic, standing between periods of prolonged sitting.  Follow-up 6 weeks.    It was a pleasure seeing Carmen today.    45 minutes on date of the encounter doing chart review, history and examination, independent imaging review, documentation, and additional activities noted above.      Velasquez Gagnon DO, CAQSM  Primary Care Sports Medicine   19-May-2024

## 2025-01-13 NOTE — OB PROVIDER H&P - NSLOWPPHRISK_OBGYN_A_OB_CAL
Patient would like assistance to aid in sleep. Verbal order from Dr. Ry Donohue for 25mg of Benadryl. 6

## 2025-01-13 NOTE — OB PROVIDER H&P - HISTORY OF PRESENT ILLNESS
34 y.o.  at 34w2d presents for scheduled admission in the setting of know vasa previa.  Pt is s/p BMZ -1/10. No complaints today. - LOf - VB - CTX +FM    Antepartum:  - Pregnancy type: IVF, own egg  - Testing: NIPT wnl  - Anatomy scan: wnl  - GCT/GTT: passed  - Hypertensive disorders of pregnancy: denies  - GBS status: unknown  - EFW: 1988g on     - ObHx: G1 current  - GynHx: denies  - PMH: denies  - PSH: denies  - SH: denies toxic habits  - Meds: Prenatal vitamins  - Allergies: No Known Allergies    Physical Exam:  T(C): --  HR: --  BP: --  RR: --  SpO2: --    General: NAD  Abdomen: soft, gravid, nontender  Extremities: nonedematous  TAUS: cephalic presentation    EFM:  140 baseline, mod ricky, + accel, - decel; reactive and reassuring  [place]: uterine irritability    A/p: 34 y.o.  at 34w2d presents for scheduled admission in the setting of know vasa previa.    #VasaPrevia   [ ] Pad counts   [ ] NST BID     # PNC   [x] PNV | SCDs | Prenatals   [ ] Next growth due      D/w: Dr. Spears

## 2025-01-14 LAB — T PALLIDUM AB TITR SER: NEGATIVE — SIGNIFICANT CHANGE UP

## 2025-01-14 RX ADMIN — Medication 325 MILLIGRAM(S): at 13:10

## 2025-01-14 RX ADMIN — Medication 1 TABLET(S): at 13:10

## 2025-01-14 RX ADMIN — Medication 1 MILLIGRAM(S): at 13:10

## 2025-01-14 NOTE — PROGRESS NOTE ADULT - ASSESSMENT
33 yo  at 34w3d scheduled admission for known vasa previa      #Vasa previa   [ ] Pad counts   [ ] NST BID -    [ ] NICU consult      #PNC   [x] PNV | SCDs | Prenatals   [ ] Next growth due   [ ] Next labs   [ ] Scheduled for c/s  (35w6d)

## 2025-01-15 RX ADMIN — Medication 325 MILLIGRAM(S): at 11:37

## 2025-01-15 RX ADMIN — Medication 1 MILLIGRAM(S): at 11:37

## 2025-01-15 RX ADMIN — Medication 1 TABLET(S): at 11:37

## 2025-01-15 NOTE — PROGRESS NOTE ADULT - ASSESSMENT
35 yo  at 34w4d scheduled admission for known vasa previa.    #Vasa previa   [ ] Pad counts   [ ] NST BID – 10am/10pm   [ ] NICU consult      #PNC   [x] PNV | SCDs | Prenatals   [ ] Next growth due    [ ] f/u CBC, T/S  (ordered)   [ ] Consented for c/s  (35w6d)

## 2025-01-16 LAB
BASOPHILS # BLD AUTO: 0.03 K/UL — SIGNIFICANT CHANGE UP (ref 0–0.2)
BASOPHILS NFR BLD AUTO: 0.3 % — SIGNIFICANT CHANGE UP (ref 0–2)
BLD GP AB SCN SERPL QL: NEGATIVE — SIGNIFICANT CHANGE UP
EOSINOPHIL # BLD AUTO: 0.08 K/UL — SIGNIFICANT CHANGE UP (ref 0–0.5)
EOSINOPHIL NFR BLD AUTO: 0.8 % — SIGNIFICANT CHANGE UP (ref 0–6)
HCT VFR BLD CALC: 36.5 % — SIGNIFICANT CHANGE UP (ref 34.5–45)
HGB BLD-MCNC: 12.3 G/DL — SIGNIFICANT CHANGE UP (ref 11.5–15.5)
IMM GRANULOCYTES NFR BLD AUTO: 1.9 % — HIGH (ref 0–0.9)
LYMPHOCYTES # BLD AUTO: 2.06 K/UL — SIGNIFICANT CHANGE UP (ref 1–3.3)
LYMPHOCYTES # BLD AUTO: 21.2 % — SIGNIFICANT CHANGE UP (ref 13–44)
MCHC RBC-ENTMCNC: 31.8 PG — SIGNIFICANT CHANGE UP (ref 27–34)
MCHC RBC-ENTMCNC: 33.7 G/DL — SIGNIFICANT CHANGE UP (ref 32–36)
MCV RBC AUTO: 94.3 FL — SIGNIFICANT CHANGE UP (ref 80–100)
MONOCYTES # BLD AUTO: 0.78 K/UL — SIGNIFICANT CHANGE UP (ref 0–0.9)
MONOCYTES NFR BLD AUTO: 8 % — SIGNIFICANT CHANGE UP (ref 2–14)
NEUTROPHILS # BLD AUTO: 6.58 K/UL — SIGNIFICANT CHANGE UP (ref 1.8–7.4)
NEUTROPHILS NFR BLD AUTO: 67.8 % — SIGNIFICANT CHANGE UP (ref 43–77)
NRBC # BLD: 0 /100 WBCS — SIGNIFICANT CHANGE UP (ref 0–0)
NRBC BLD-RTO: 0 /100 WBCS — SIGNIFICANT CHANGE UP (ref 0–0)
PLATELET # BLD AUTO: 223 K/UL — SIGNIFICANT CHANGE UP (ref 150–400)
RBC # BLD: 3.87 M/UL — SIGNIFICANT CHANGE UP (ref 3.8–5.2)
RBC # FLD: 14.3 % — SIGNIFICANT CHANGE UP (ref 10.3–14.5)
RH IG SCN BLD-IMP: POSITIVE — SIGNIFICANT CHANGE UP
WBC # BLD: 9.71 K/UL — SIGNIFICANT CHANGE UP (ref 3.8–10.5)
WBC # FLD AUTO: 9.71 K/UL — SIGNIFICANT CHANGE UP (ref 3.8–10.5)

## 2025-01-16 RX ORDER — POLYETHYLENE GLYCOL 3350 17 G/17G
17 POWDER, FOR SOLUTION ORAL DAILY
Refills: 0 | Status: DISCONTINUED | OUTPATIENT
Start: 2025-01-16 | End: 2025-01-23

## 2025-01-16 RX ADMIN — Medication 1 MILLIGRAM(S): at 11:35

## 2025-01-16 RX ADMIN — POLYETHYLENE GLYCOL 3350 17 GRAM(S): 17 POWDER, FOR SOLUTION ORAL at 09:20

## 2025-01-16 RX ADMIN — Medication 1 TABLET(S): at 11:35

## 2025-01-16 RX ADMIN — Medication 325 MILLIGRAM(S): at 11:35

## 2025-01-16 NOTE — DIETITIAN INITIAL EVALUATION ADULT - OTHER INFO
Patient seen at bedside for length of stay assessment. Patient  34w5d pregnant being monitored for vasa previa. Current diet order: regular. Reports good appetite. Reports some nausea/vomiting during pregnancy up until just prior to hospital admission. Provided nutrition education - see below. Pre-pregnancy weight: 134 pounds. Dosing weight: 151 pounds. Noted with +1 BL foot edema. Denies nausea/vomiting/diarrhea - endorses constipation, says she received stool softener today. Labs: sodium 130. Meds: bowel regimen, iron, folic acid, prenatal MVI.

## 2025-01-16 NOTE — DIETITIAN INITIAL EVALUATION ADULT - OTHER CALCULATIONS
IBW (110 pounds) used to calculate needs since pre-pregnancy weight above % of IBW (122% IBW). Needs adjusted for 3rd trimester of pregnancy.  Estimated calorie needs: 1080-6279 kcal (25-30 kcal/kg + 452)  Estimated protein needs: 71g protein  Estimated fluid needs: 1750-2000mL (35-40mL/kg)

## 2025-01-16 NOTE — DIETITIAN INITIAL EVALUATION ADULT - PERTINENT MEDS FT
MEDICATIONS  (STANDING):  ferrous    sulfate 325 milliGRAM(s) Oral daily  folic acid 1 milliGRAM(s) Oral daily  polyethylene glycol 3350 17 Gram(s) Oral daily  prenatal multivitamin 1 Tablet(s) Oral daily    MEDICATIONS  (PRN):

## 2025-01-16 NOTE — DIETITIAN INITIAL EVALUATION ADULT - ADD RECOMMEND
1. Continue with regular diet and prenatal MVI  2. Encourage pt to meet nutritional needs as able   3. Monitor labs: electrolytes, cbc  4. Monitor weights   5. Pain and bowel regimen per team   6. Will continue to assess/honor food preferences as able  7. Monitor adherence to diet education

## 2025-01-16 NOTE — PROGRESS NOTE ADULT - ASSESSMENT
33 yo  at 34w5d scheduled admission for known vasa previa      #Vasa previa   [ ] monitor  [ ] NST BID – 10am/10pm    #PNC   [x] PNV |SCDs | Prenatals   [ ] Next growth due    [ ] f/u CBC, T/S  (ordered)   [ ] Consented for CS  (35w6d)

## 2025-01-16 NOTE — DIETITIAN INITIAL EVALUATION ADULT - PERSON TAUGHT/METHOD
importance of adequate protein and calories, food sources of protein, adequate fluids, continuing with prenatal MVI, importance of adequate fruits and vegetables. Patient verbalized understanding./verbal instruction/patient instructed

## 2025-01-17 DIAGNOSIS — O44.03 COMPLETE PLACENTA PREVIA NOS OR WITHOUT HEMORRHAGE, THIRD TRIMESTER: ICD-10-CM

## 2025-01-17 DIAGNOSIS — O47.03 FALSE LABOR BEFORE 37 COMPLETED WEEKS OF GESTATION, THIRD TRIMESTER: ICD-10-CM

## 2025-01-17 DIAGNOSIS — O09.813 SUPERVISION OF PREGNANCY RESULTING FROM ASSISTED REPRODUCTIVE TECHNOLOGY, THIRD TRIMESTER: ICD-10-CM

## 2025-01-17 DIAGNOSIS — Z3A.33 33 WEEKS GESTATION OF PREGNANCY: ICD-10-CM

## 2025-01-17 RX ADMIN — Medication 1 TABLET(S): at 11:44

## 2025-01-17 RX ADMIN — Medication 1 MILLIGRAM(S): at 11:44

## 2025-01-17 RX ADMIN — POLYETHYLENE GLYCOL 3350 17 GRAM(S): 17 POWDER, FOR SOLUTION ORAL at 11:44

## 2025-01-17 RX ADMIN — Medication 325 MILLIGRAM(S): at 11:44

## 2025-01-17 NOTE — PROGRESS NOTE ADULT - ASSESSMENT
35yo  at 34w6d scheduled admit for known vasa previa. No complaints at this time.     #Vasa previa   [ ] NST BID – 10am/10pm   [ ] NICU consult      #PNC   [x] PNV | SCDs | Prenatals | No 2am vitals per attending    [ ] Next growth due    [ ] F/u CBC, T/S  (ordered)   [x] Consented for CS  (35w6d)

## 2025-01-18 RX ORDER — SODIUM CHLORIDE 9 G/ML
1000 INJECTION, SOLUTION INTRAVENOUS
Refills: 0 | Status: DISCONTINUED | OUTPATIENT
Start: 2025-01-18 | End: 2025-01-19

## 2025-01-18 RX ADMIN — Medication 1 TABLET(S): at 11:34

## 2025-01-18 RX ADMIN — Medication 1 MILLIGRAM(S): at 11:34

## 2025-01-18 RX ADMIN — Medication 325 MILLIGRAM(S): at 11:34

## 2025-01-18 RX ADMIN — SODIUM CHLORIDE 125 MILLILITER(S): 9 INJECTION, SOLUTION INTRAVENOUS at 22:15

## 2025-01-18 NOTE — PROGRESS NOTE ADULT - ASSESSMENT
33yo  at 35w0d scheduled admit for known vasa previa.    #Vasa previa   [ ] NST BID – 10am/10pm   [ ] NICU consult        #PNC   [x] PNV | SCDs | Prenatals | No 2am vitals per attending    [ ] Next growth next week if possible   [ ] F/u CBC, T/S  (ordered)

## 2025-01-19 LAB
BASOPHILS # BLD AUTO: 0.02 K/UL — SIGNIFICANT CHANGE UP (ref 0–0.2)
BASOPHILS NFR BLD AUTO: 0.2 % — SIGNIFICANT CHANGE UP (ref 0–2)
BLD GP AB SCN SERPL QL: NEGATIVE — SIGNIFICANT CHANGE UP
EOSINOPHIL # BLD AUTO: 0.04 K/UL — SIGNIFICANT CHANGE UP (ref 0–0.5)
EOSINOPHIL NFR BLD AUTO: 0.5 % — SIGNIFICANT CHANGE UP (ref 0–6)
HCT VFR BLD CALC: 34.7 % — SIGNIFICANT CHANGE UP (ref 34.5–45)
HGB BLD-MCNC: 12 G/DL — SIGNIFICANT CHANGE UP (ref 11.5–15.5)
IMM GRANULOCYTES NFR BLD AUTO: 0.9 % — SIGNIFICANT CHANGE UP (ref 0–0.9)
LYMPHOCYTES # BLD AUTO: 1.99 K/UL — SIGNIFICANT CHANGE UP (ref 1–3.3)
LYMPHOCYTES # BLD AUTO: 24.7 % — SIGNIFICANT CHANGE UP (ref 13–44)
MCHC RBC-ENTMCNC: 32.5 PG — SIGNIFICANT CHANGE UP (ref 27–34)
MCHC RBC-ENTMCNC: 34.6 G/DL — SIGNIFICANT CHANGE UP (ref 32–36)
MCV RBC AUTO: 94 FL — SIGNIFICANT CHANGE UP (ref 80–100)
MONOCYTES # BLD AUTO: 0.65 K/UL — SIGNIFICANT CHANGE UP (ref 0–0.9)
MONOCYTES NFR BLD AUTO: 8.1 % — SIGNIFICANT CHANGE UP (ref 2–14)
NEUTROPHILS # BLD AUTO: 5.28 K/UL — SIGNIFICANT CHANGE UP (ref 1.8–7.4)
NEUTROPHILS NFR BLD AUTO: 65.6 % — SIGNIFICANT CHANGE UP (ref 43–77)
NRBC # BLD: 0 /100 WBCS — SIGNIFICANT CHANGE UP (ref 0–0)
NRBC BLD-RTO: 0 /100 WBCS — SIGNIFICANT CHANGE UP (ref 0–0)
PLATELET # BLD AUTO: 197 K/UL — SIGNIFICANT CHANGE UP (ref 150–400)
RBC # BLD: 3.69 M/UL — LOW (ref 3.8–5.2)
RBC # FLD: 14.1 % — SIGNIFICANT CHANGE UP (ref 10.3–14.5)
RH IG SCN BLD-IMP: POSITIVE — SIGNIFICANT CHANGE UP
WBC # BLD: 8.05 K/UL — SIGNIFICANT CHANGE UP (ref 3.8–10.5)
WBC # FLD AUTO: 8.05 K/UL — SIGNIFICANT CHANGE UP (ref 3.8–10.5)

## 2025-01-19 RX ADMIN — Medication 325 MILLIGRAM(S): at 12:47

## 2025-01-19 RX ADMIN — Medication 1 TABLET(S): at 12:46

## 2025-01-19 RX ADMIN — Medication 1 MILLIGRAM(S): at 12:47

## 2025-01-19 RX ADMIN — SODIUM CHLORIDE 125 MILLILITER(S): 9 INJECTION, SOLUTION INTRAVENOUS at 13:04

## 2025-01-19 NOTE — PROGRESS NOTE ADULT - ASSESSMENT
33yo  at 35w1d scheduled admit for known vasa previa.       #Vasa previa   [ ] NST BID – 10am/10pm   [ ] NICU consult      #PNC   [x] PNV | SCDs | Prenatals | No 2am vitals per attending    [ ] Next growth next week if possible   [ ] F/u CBC, T/S  (ordered)   [x] Consented for CS  (35w6d)

## 2025-01-20 RX ADMIN — Medication 1 MILLIGRAM(S): at 11:40

## 2025-01-20 RX ADMIN — Medication 325 MILLIGRAM(S): at 11:40

## 2025-01-20 RX ADMIN — POLYETHYLENE GLYCOL 3350 17 GRAM(S): 17 POWDER, FOR SOLUTION ORAL at 11:39

## 2025-01-20 RX ADMIN — Medication 1 TABLET(S): at 11:40

## 2025-01-20 NOTE — PROGRESS NOTE ADULT - ASSESSMENT
35yo  at 35w2d scheduled admit for known vasa previa.    #Vasa previa   [ ] NST BID – 10am/10pm  [ ] Growth scans PRN  [ ] T+S  ordered 33yo  at 35w2d scheduled admit for known vasa previa.    #Vasa previa   [ ] NST BID – 10am/10pm  [ ] Growth scans PRN  [ ] T+S  ordered  [x] consented for c section 35+6

## 2025-01-21 ENCOUNTER — APPOINTMENT (OUTPATIENT)
Dept: ANTEPARTUM | Facility: CLINIC | Age: 35
End: 2025-01-21
Payer: COMMERCIAL

## 2025-01-21 ENCOUNTER — ASOB RESULT (OUTPATIENT)
Age: 35
End: 2025-01-21

## 2025-01-21 PROCEDURE — 76816 OB US FOLLOW-UP PER FETUS: CPT

## 2025-01-21 PROCEDURE — 76820 UMBILICAL ARTERY ECHO: CPT | Mod: 59

## 2025-01-21 PROCEDURE — 76819 FETAL BIOPHYS PROFIL W/O NST: CPT | Mod: 59

## 2025-01-21 RX ADMIN — Medication 325 MILLIGRAM(S): at 11:49

## 2025-01-21 RX ADMIN — Medication 1 TABLET(S): at 11:49

## 2025-01-21 RX ADMIN — Medication 1 MILLIGRAM(S): at 11:49

## 2025-01-21 NOTE — PROGRESS NOTE ADULT - ASSESSMENT
35yo  at 35w3d scheduled admit for known vasa previa.      Vasa previa   [ ] NST BID – 10am/10pm     #PNC   [x] PNV | SCDs | Prenatals | No 2am vitals per attending    [ ] Growth scan due; will try to schedule today  [ ] T+S  ordered

## 2025-01-22 LAB
ALBUMIN SERPL ELPH-MCNC: 3.5 G/DL — SIGNIFICANT CHANGE UP (ref 3.3–5)
ALP SERPL-CCNC: 96 U/L — SIGNIFICANT CHANGE UP (ref 40–120)
ALT FLD-CCNC: 10 U/L — SIGNIFICANT CHANGE UP (ref 10–45)
ANION GAP SERPL CALC-SCNC: 10 MMOL/L — SIGNIFICANT CHANGE UP (ref 5–17)
AST SERPL-CCNC: 14 U/L — SIGNIFICANT CHANGE UP (ref 10–40)
BASOPHILS # BLD AUTO: 0.02 K/UL — SIGNIFICANT CHANGE UP (ref 0–0.2)
BASOPHILS NFR BLD AUTO: 0.2 % — SIGNIFICANT CHANGE UP (ref 0–2)
BILIRUB SERPL-MCNC: 0.4 MG/DL — SIGNIFICANT CHANGE UP (ref 0.2–1.2)
BLD GP AB SCN SERPL QL: NEGATIVE — SIGNIFICANT CHANGE UP
BUN SERPL-MCNC: 7 MG/DL — SIGNIFICANT CHANGE UP (ref 7–23)
CALCIUM SERPL-MCNC: 9.2 MG/DL — SIGNIFICANT CHANGE UP (ref 8.4–10.5)
CHLORIDE SERPL-SCNC: 102 MMOL/L — SIGNIFICANT CHANGE UP (ref 96–108)
CO2 SERPL-SCNC: 23 MMOL/L — SIGNIFICANT CHANGE UP (ref 22–31)
CREAT SERPL-MCNC: 0.48 MG/DL — LOW (ref 0.5–1.3)
EGFR: 127 ML/MIN/1.73M2 — SIGNIFICANT CHANGE UP
EOSINOPHIL # BLD AUTO: 0.04 K/UL — SIGNIFICANT CHANGE UP (ref 0–0.5)
EOSINOPHIL NFR BLD AUTO: 0.5 % — SIGNIFICANT CHANGE UP (ref 0–6)
GLUCOSE SERPL-MCNC: 90 MG/DL — SIGNIFICANT CHANGE UP (ref 70–99)
HCT VFR BLD CALC: 36.2 % — SIGNIFICANT CHANGE UP (ref 34.5–45)
HGB BLD-MCNC: 12.4 G/DL — SIGNIFICANT CHANGE UP (ref 11.5–15.5)
IMM GRANULOCYTES NFR BLD AUTO: 0.8 % — SIGNIFICANT CHANGE UP (ref 0–0.9)
LYMPHOCYTES # BLD AUTO: 1.5 K/UL — SIGNIFICANT CHANGE UP (ref 1–3.3)
LYMPHOCYTES # BLD AUTO: 17.7 % — SIGNIFICANT CHANGE UP (ref 13–44)
MCHC RBC-ENTMCNC: 32.2 PG — SIGNIFICANT CHANGE UP (ref 27–34)
MCHC RBC-ENTMCNC: 34.3 G/DL — SIGNIFICANT CHANGE UP (ref 32–36)
MCV RBC AUTO: 94 FL — SIGNIFICANT CHANGE UP (ref 80–100)
MONOCYTES # BLD AUTO: 0.61 K/UL — SIGNIFICANT CHANGE UP (ref 0–0.9)
MONOCYTES NFR BLD AUTO: 7.2 % — SIGNIFICANT CHANGE UP (ref 2–14)
NEUTROPHILS # BLD AUTO: 6.24 K/UL — SIGNIFICANT CHANGE UP (ref 1.8–7.4)
NEUTROPHILS NFR BLD AUTO: 73.6 % — SIGNIFICANT CHANGE UP (ref 43–77)
NRBC # BLD: 0 /100 WBCS — SIGNIFICANT CHANGE UP (ref 0–0)
NRBC BLD-RTO: 0 /100 WBCS — SIGNIFICANT CHANGE UP (ref 0–0)
PLATELET # BLD AUTO: 185 K/UL — SIGNIFICANT CHANGE UP (ref 150–400)
POTASSIUM SERPL-MCNC: 4.5 MMOL/L — SIGNIFICANT CHANGE UP (ref 3.5–5.3)
POTASSIUM SERPL-SCNC: 4.5 MMOL/L — SIGNIFICANT CHANGE UP (ref 3.5–5.3)
PROT SERPL-MCNC: 6.4 G/DL — SIGNIFICANT CHANGE UP (ref 6–8.3)
RBC # BLD: 3.85 M/UL — SIGNIFICANT CHANGE UP (ref 3.8–5.2)
RBC # FLD: 14.1 % — SIGNIFICANT CHANGE UP (ref 10.3–14.5)
RH IG SCN BLD-IMP: POSITIVE — SIGNIFICANT CHANGE UP
SODIUM SERPL-SCNC: 135 MMOL/L — SIGNIFICANT CHANGE UP (ref 135–145)
WBC # BLD: 8.48 K/UL — SIGNIFICANT CHANGE UP (ref 3.8–10.5)
WBC # FLD AUTO: 8.48 K/UL — SIGNIFICANT CHANGE UP (ref 3.8–10.5)

## 2025-01-22 RX ADMIN — Medication 325 MILLIGRAM(S): at 12:22

## 2025-01-22 RX ADMIN — Medication 1 TABLET(S): at 12:22

## 2025-01-22 RX ADMIN — Medication 1 MILLIGRAM(S): at 12:22

## 2025-01-22 RX ADMIN — POLYETHYLENE GLYCOL 3350 17 GRAM(S): 17 POWDER, FOR SOLUTION ORAL at 12:22

## 2025-01-22 NOTE — PROGRESS NOTE ADULT - ASSESSMENT
35yo  at 35w4d scheduled admit for known vasa previa.      #Vasa previa   [ ] NST BID – 10am/10pm   [ ] BH scan  could not visualize vasa previa though was with limited views; f/u repeat BH scan       #PNC   [x] PNV | SCDs | Prenatals | No 2am vitals per attending    [ ] f/u T/S  (ordered)   [x] Consented for CS  (35w6d)

## 2025-01-23 ENCOUNTER — APPOINTMENT (OUTPATIENT)
Dept: ANTEPARTUM | Facility: CLINIC | Age: 35
End: 2025-01-23
Payer: COMMERCIAL

## 2025-01-23 ENCOUNTER — ASOB RESULT (OUTPATIENT)
Age: 35
End: 2025-01-23

## 2025-01-23 ENCOUNTER — TRANSCRIPTION ENCOUNTER (OUTPATIENT)
Age: 35
End: 2025-01-23

## 2025-01-23 VITALS
TEMPERATURE: 98 F | RESPIRATION RATE: 18 BRPM | OXYGEN SATURATION: 100 % | SYSTOLIC BLOOD PRESSURE: 133 MMHG | HEART RATE: 99 BPM | DIASTOLIC BLOOD PRESSURE: 84 MMHG

## 2025-01-23 PROCEDURE — 86850 RBC ANTIBODY SCREEN: CPT

## 2025-01-23 PROCEDURE — 76815 OB US LIMITED FETUS(S): CPT

## 2025-01-23 PROCEDURE — 84550 ASSAY OF BLOOD/URIC ACID: CPT

## 2025-01-23 PROCEDURE — 36415 COLL VENOUS BLD VENIPUNCTURE: CPT

## 2025-01-23 PROCEDURE — 85025 COMPLETE CBC W/AUTO DIFF WBC: CPT

## 2025-01-23 PROCEDURE — 86780 TREPONEMA PALLIDUM: CPT

## 2025-01-23 PROCEDURE — 76819 FETAL BIOPHYS PROFIL W/O NST: CPT

## 2025-01-23 PROCEDURE — 85730 THROMBOPLASTIN TIME PARTIAL: CPT

## 2025-01-23 PROCEDURE — 85027 COMPLETE CBC AUTOMATED: CPT

## 2025-01-23 PROCEDURE — 86901 BLOOD TYPING SEROLOGIC RH(D): CPT

## 2025-01-23 PROCEDURE — 76817 TRANSVAGINAL US OBSTETRIC: CPT

## 2025-01-23 PROCEDURE — 85384 FIBRINOGEN ACTIVITY: CPT

## 2025-01-23 PROCEDURE — 80053 COMPREHEN METABOLIC PANEL: CPT

## 2025-01-23 PROCEDURE — 76820 UMBILICAL ARTERY ECHO: CPT

## 2025-01-23 PROCEDURE — 86900 BLOOD TYPING SEROLOGIC ABO: CPT

## 2025-01-23 RX ORDER — PNV WITH CALCIUM NO.11/IRON/FA 65 MG-1 MG
1 TABLET ORAL
Qty: 0 | Refills: 0 | DISCHARGE
Start: 2025-01-23

## 2025-01-23 RX ORDER — FERROUS SULFATE 325(65) MG
1 TABLET ORAL
Qty: 0 | Refills: 0 | DISCHARGE
Start: 2025-01-23

## 2025-01-23 RX ADMIN — Medication 1 MILLIGRAM(S): at 14:19

## 2025-01-23 RX ADMIN — Medication 325 MILLIGRAM(S): at 14:18

## 2025-01-23 RX ADMIN — Medication 1 TABLET(S): at 14:18

## 2025-01-23 NOTE — DISCHARGE NOTE ANTEPARTUM - MEDICATION SUMMARY - MEDICATIONS TO TAKE
I will START or STAY ON the medications listed below when I get home from the hospital:    Prenatal Multivitamins with Folic Acid 1 mg oral tablet  -- 1 tab(s) by mouth once a day  -- Indication: For PREGNANCY    ferrous sulfate 325 mg (65 mg elemental iron) oral tablet  -- 1 tab(s) by mouth once a day  -- Indication: For PREGNANCY

## 2025-01-23 NOTE — DISCHARGE NOTE ANTEPARTUM - MEDICATION SUMMARY - MEDICATIONS TO STOP TAKING
I will STOP taking the medications listed below when I get home from the hospital:    Truvada 200 mg-300 mg oral tablet  -- 1 tab(s) by mouth once a day  -- Check with your doctor before becoming pregnant.  It is very important that you take or use this exactly as directed.  Do not skip doses or discontinue unless directed by your doctor.  Store this medication in the original package.    Isentress 400 mg oral tablet  -- 1 tab(s) by mouth 2 times a day  -- Check with your doctor before becoming pregnant.  It is very important that you take or use this exactly as directed.  Do not skip doses or discontinue unless directed by your doctor.  Obtain medical advice before taking any non-prescription drugs as some may affect the action of this medication.  Store this medication in the original package.  Swallow whole.  Do not crush.

## 2025-01-23 NOTE — DISCHARGE NOTE ANTEPARTUM - NS MD DC FALL RISK RISK
For information on Fall & Injury Prevention, visit: [email or web address] OR  [email or web address] OR  https://www.cdc.gov/steadi/patient.html

## 2025-01-23 NOTE — PROGRESS NOTE ADULT - REASON FOR ADMISSION
Vasa previa
antepartum
vasa previa

## 2025-01-23 NOTE — DISCHARGE NOTE ANTEPARTUM - CARE PROVIDER_API CALL
Latia Spears  Obstetrics and Gynecology  [address] [address]  Phone: [phone]  Fax: [phone]  Established Patient  Follow Up Time: 1-3 days   Latia Spears  Obstetrics and Gynecology  [address] [address]  Phone: [phone]  Fax: [phone]  Established Patient  Follow Up Time: 1 week

## 2025-01-23 NOTE — DISCHARGE NOTE ANTEPARTUM - PATIENT PORTAL LINK FT
You can access the FollowMyHealth Patient Portal offered by [facility] by registering at the following website: [email or web address]. By joining [employer]’s FollowMyHealth portal, you will also be able to view your health information using other applications (apps) compatible with our system.

## 2025-01-23 NOTE — DISCHARGE NOTE ANTEPARTUM - PLAN OF CARE
- Please continue to take your prenatal vitamin daily  - Please follow up with your primary OBGYN and MFM for ultrasounds as scheduled  - Please return to OB triage or your nearest hospital if you experience decreased fetal movements, contractions, vaginal bleeding, or leakage of fluid

## 2025-01-23 NOTE — CHART NOTE - NSCHARTNOTESELECT_GEN_ALL_CORE
Event Note
NST/Event Note
Nutrition Services
BPP & Growth Scan/Event Note
Event Note
NST/Event Note
NST/Event Note

## 2025-01-23 NOTE — PROGRESS NOTE ADULT - ASSESSMENT
35yo  at 35w5d scheduled admit for known vasa previa.    #Vasa previa   [ ] NST BID – 10am/10pm   [ ] f/u BH scan  1300     #PNC   [x] PNV | SCDs | Prenatals | No 2am vitals per attending    [x] Consented for CS  (35w6d)

## 2025-01-23 NOTE — DISCHARGE NOTE ANTEPARTUM - PROVIDER TOKENS
PROVIDER:[TOKEN:[[id number]],FOLLOWUP:[1-3 days],ESTABLISHEDPATIENT:[T]] PROVIDER:[TOKEN:[[id number]],FOLLOWUP:[1 week],ESTABLISHEDPATIENT:[T]]

## 2025-01-23 NOTE — DISCHARGE NOTE ANTEPARTUM - CARE PLAN
1 Principal Discharge DX:	Pregnancy  Assessment and plan of treatment:	- Please continue to take your prenatal vitamin daily  - Please follow up with your primary OBGYN and MFM for ultrasounds as scheduled  - Please return to OB triage or your nearest hospital if you experience decreased fetal movements, contractions, vaginal bleeding, or leakage of fluid

## 2025-01-23 NOTE — DISCHARGE NOTE ANTEPARTUM - HOSPITAL COURSE
35 yo  presented  for scheduled admission due to known vasa previa. She received betamethasone for fetal lung maturity -1/10. She did not experience contractions or vaginal bleeding throughout her stay and it was found on [facility] ultrasound  and  that no vasa previa was present. Patient was discharged home at 35w5d in stable condition with reassuring fetal status and plans for close OBGYN follow up.

## 2025-01-23 NOTE — CHART NOTE - NSCHARTNOTEFT_GEN_A_CORE
NST reveiwed  FHR: baseline 140; moderate variability; +accels; -decels; reactive and reassuring tracing  TOCO: no contractions seen
NST reviewed  FHR baseline 140; moderate variability; +accels; isolated variable x1; reactive and reassuring tracing  TOCO no contractions seen
NST reviewed  baseline 140/mod ricky/+accel/no decel. Reactive and reassuring  no tx on toco    [place] PGY3
Note entry delayed 2/2 pt care. FHT reviewed. Baseline 140, mod variability, +Accels, -decels. Irreg ctx which decreased significantly from last night. Pt denies cramping/contractions. Will decrease monitoring to NST TID.
Patient seen at bedside. Endorses good FM; denies VB, LOF, CTX.  TAUS: cephalic fetus; anterior placenta; BPP 8/8; ALEX 9cm  NST: baseline 140; moderate variability; +accels; -decels; reactive and reassuring tracing  TOCO: isolated contraction x1
To whom it may concern,    Please excuse Mykel Mercedes from work related duties until 2/3/2025 for pregnancy-related conditions. She was admitted to [facility] 1/13-1/23/2025 for antepartum-related care. Please feel free to contact us with any questions.    Best regards,      Bello Light DO, PGY2  OBGYN, [facility], [facility]  [phone]
nst reviewed  baseline 140/mod ricky/+accel/no decel. reactive and reassuring  no ctx
nst reviewed  baseline 140/mod ricky/+accel/no decel. reactive and reassuring  no ctx
Admitting Diagnosis:   Patient is a 34y old  Female who presents with a chief complaint of Vasa previa (2025 08:02)  PAST MEDICAL & SURGICAL HISTORY:  No pertinent past medical history  No significant past surgical history      Current Nutrition Order: regular diet    PO Intake: Good (%) [   ]  Fair (50-75%) [   ] Poor (<25%) [   ] *fair to good PO intakes ~75% overall*    GI Issues: no noted GI upset per pt reports, regular BM noted, pt is on a bowel regimen, pt stated this has helped her use the bathroom; no documented abdominal distention noted    Pain: no noted pain/discomfort     Skin Integrity: skin intact per nursing documentation; no edema or pressure ulcers noted; [place]: 21      Labs:       135  |  102  |  7   ----------------------------<  90  4.5   |  23  |  0.48[L]    Ca    9.2      2025 08:20    TPro  6.4  /  Alb  3.5  /  TBili  0.4  /  DBili  x   /  AST  14  /  ALT  10  /  AlkPhos  96      CAPILLARY BLOOD GLUCOSE    Medications:  MEDICATIONS  (STANDING):  ferrous    sulfate 325 milliGRAM(s) Oral daily  folic acid 1 milliGRAM(s) Oral daily  polyethylene glycol 3350 17 Gram(s) Oral daily  prenatal multivitamin 1 Tablet(s) Oral daily    MEDICATIONS  (PRN):    Dosing Anthropometrics:   Height for BMI (FEET)	5 Feet  Height for BMI (INCHES)	2 Inch(s)  Height for BMI (CM)	157.48 Centimeter(s)  Weight for BMI (lbs)	134 lb  Weight for BMI (kg)	60.8 kg  Body Mass Index	              24.5  other calculations: IBW (110 pounds) used to calculate needs since pre-pregnancy weight above % of IBW (122% IBW). Needs adjusted for 3rd trimester of pregnancy.    Weight Change: no new weights noted in electronic medical records; recommend that nursing obtain updated weights weekly prn. RD to monitor trends.     Estimated energy needs:   Estimated calorie needs: 0611-0347 kcal (25-30 kcal/kg + 452)  Estimated protein needs: 71g protein  Estimated fluid needs: 1750-2000mL (35-40mL/kg)    Subjective: This is a 34 year old,  at 34w2d who presented for scheduled admission in the setting of know vasa previa. Pt is status post BMZ -1/10.    RD spoke to pt for nutrition follow up evaluation. Pt appeared in pleasant spirits. Pt noted with fair to good appetite and PO intakes overall per pt and nursing reports; RD observed ~75% of her breakfast tray consumed (turkey sausage, toast, fruit, hot beverage). No major GI upset noted, previous constipation noted however pt stated the stool softener medication has helped with this. No issues chewing/swallowing. Labs reviewed: low creatinine (0.48), other labs within normal limits; medical team is aware, RD to monitor trends. RD reviewed the importance of adequate protein and calories, food sources of protein, adequate fluids, continuing with prenatal multivitamin, importance of adequate fruits and vegetables. Patient verbalized understanding. RD to remain available. Please see additional nutrition recommendations below.     Previous Nutrition Diagnosis: Food & Nutrition Related Knowledge Deficit related to lack of prior nutrition education as evidenced by discussion of pregnancy nutrition therapy    Active [ x ]  Resolved [   ]    If resolved, new PES:     Goal: Pt to recall and verbalize at least 3 nutrition education points    Nutrition Recommendations:  1. Continue with regular diet and prenatal multivitamin  2. Encourage pt to meet nutritional needs as able   3. Monitor labs: electrolytes, cbc  4. Monitor weights   5. Pain and bowel regimen per team   6. Will continue to assess/honor food preferences as able  7. Monitor adherence to diet education  8. Align nutrition with goals of care at all times    Education: RD reviewed the importance of adequate protein and calories, food sources of protein, adequate fluids, continuing with prenatal multivitamin, importance of adequate fruits and vegetables. Patient verbalized understanding.    Risk Level: High [   ] Moderate [ x ] Low [   ]
NST reviewed  FHR: baseline 140; moderate variability; +accels; -decels; reactive and reassuring  TOCO: 1 isolated contraction
NST reviewed  baseline 130/MOD MOIZ/+accel/no decel. reactive and reassuring    [place] PGY3
NST reviewed  baseline 130/mod ricky/+accel/no decel. Reactive and reassuring  no ctx on toco
note delayed due to patient care  baseline 130/mod ricky/+accel/no decel. Reactive and reassuring.  irregular ctx on toco not felt by patient
nst reviewed  baseline 130/mod ricky/+accel/no decel. reactive and reassuring  2x ctx in 40 mins, not felt by patient    [place] PGY3
nst reviewed  baseline 130/mod ricky/+accel/no decel. reactive and reassuring  intermittent ctx, not felt by patient
nst reviewed  baseline 140/mod ricky/+accel/no decel. Reactive and reassuring  no ctx on toco

## 2025-01-23 NOTE — DISCHARGE NOTE ANTEPARTUM - FINANCIAL ASSISTANCE
[facility] provides services at a reduced cost to those who are determined to be eligible through [facility]’s financial assistance program. Information regarding [facility]’s financial assistance program can be found by going to [email or web address] or by calling [phone].

## 2025-01-23 NOTE — PROGRESS NOTE ADULT - SUBJECTIVE AND OBJECTIVE BOX
Patient evaluated at bedside. Endorses good FM; denies CTX, VB, LOF.  Patient denies visual disturbances including scotoma, headache, RUQ  pain, nausea, vomiting, epigastric pain, shortness of breath.       T(C): 36.6 (25 @ 06:08), Max: 36.8 (25 @ 22:34)  HR: 85 (25 @ 06:08) (85 - 98)  BP: 118/79 (25 @ 06:08) (117/75 - 123/82)  RR: 17 (25 @ 06:08) (17 - 18)  SpO2: 97% (25 @ 06:08) (97% - 100%)    Gen: no apparent distress  Pulm: no increased work of breathing  Abd: soft, nontender,   Ext: trace pedal edema bilaterally  NST: baseline 140; moderate variability; +accels; -decels; reactive and reassuring tracing  TOCO: no contractions seen      ferrous    sulfate 325 milliGRAM(s) Oral daily  folic acid 1 milliGRAM(s) Oral daily  polyethylene glycol 3350 17 Gram(s) Oral daily  prenatal multivitamin 1 Tablet(s) Oral daily                    
Pt feeling well this morning. No complaints - LOF - CTX - VB +FM.     T(C): 36.7 (01-17-25 @ 18:00), Max: 37.1 (01-17-25 @ 10:00)  HR: 101 (01-17-25 @ 18:00) (86 - 108)  BP: 122/85 (01-17-25 @ 18:00) (111/67 - 123/81)  RR: 18 (01-17-25 @ 18:00) (17 - 18)  SpO2: 96% (01-17-25 @ 18:00) (96% - 98%)  I&O's Summary    Physical Exam:  General: NAD  Abdomen: soft, gravid, nontender  Extremities: nonedematous    see other notes for nst review    MEDICATIONS  (STANDING):  ferrous    sulfate 325 milliGRAM(s) Oral daily  folic acid 1 milliGRAM(s) Oral daily  polyethylene glycol 3350 17 Gram(s) Oral daily  prenatal multivitamin 1 Tablet(s) Oral daily    MEDICATIONS  (PRN):      LABS:                        
Pt feeling well this morning. No complaints - LOF - CTX - VB +FM.     T(C): 36.7 (01-19-25 @ 21:45), Max: 36.9 (01-19-25 @ 06:00)  HR: 99 (01-19-25 @ 21:45) (88 - 99)  BP: 117/71 (01-19-25 @ 21:45) (114/65 - 120/82)  RR: 18 (01-19-25 @ 21:45) (16 - 18)  SpO2: 98% (01-19-25 @ 21:45) (96% - 99%)  I&O's Summary    18 Jan 2025 07:01  -  19 Jan 2025 07:00  --------------------------------------------------------  IN: 1400 mL / OUT: 0 mL / NET: 1400 mL      Physical Exam:  General: NAD  Abdomen: soft, gravid, nontender  Extremities: nonedematous    see prior notes for nst review    MEDICATIONS  (STANDING):  ferrous    sulfate 325 milliGRAM(s) Oral daily  folic acid 1 milliGRAM(s) Oral daily  polyethylene glycol 3350 17 Gram(s) Oral daily  prenatal multivitamin 1 Tablet(s) Oral daily    MEDICATIONS  (PRN):      LABS:                        12.0   8.05  )-----------( 197      ( 19 Jan 2025 05:30 )             34.7                         
  Pt feeling well this morning. No complaints - LOF - CTX - VB +FM.  PT is completely comfortable and on IVF at this time.    T(C): 36.9 (01-19-25 @ 06:00), Max: 36.9 (01-19-25 @ 06:00)  HR: 88 (01-19-25 @ 06:00) (88 - 106)  BP: 114/65 (01-19-25 @ 06:00) (111/75 - 121/81)  RR: 18 (01-19-25 @ 06:00) (16 - 18)  SpO2: 99% (01-19-25 @ 06:00) (96% - 99%)  I&O's Summary    18 Jan 2025 07:01  -  19 Jan 2025 07:00  --------------------------------------------------------  IN: 1400 mL / OUT: 0 mL / NET: 1400 mL      Physical Exam:  General: NAD  Abdomen: soft, gravid, nontender  Extremities: nonedematous    EFM Reviewed:  125 Baseline, mod ricky, + accel, - decel; reactive and reassuring  [place]: no ctx    MEDICATIONS  (STANDING):  ferrous    sulfate 325 milliGRAM(s) Oral daily  folic acid 1 milliGRAM(s) Oral daily  lactated ringers. 1000 milliLiter(s) (125 mL/Hr) IV Continuous <Continuous>  polyethylene glycol 3350 17 Gram(s) Oral daily  prenatal multivitamin 1 Tablet(s) Oral daily    MEDICATIONS  (PRN):      LABS:                        12.0   8.05  )-----------( 197      ( 19 Jan 2025 05:30 )             34.7                         
Patient evaluated at bedside. Endorses FM; denies LOF, VB, CTX.  Patient denies visual disturbances including scotoma, headache, RUQ  pain, nausea, vomiting, epigastric pain, shortness of breath.      T(C): 36.7 (01-15-25 @ 06:00), Max: 36.7 (01-14-25 @ 22:00)  HR: 87 (01-15-25 @ 06:00) (87 - 100)  BP: 116/75 (01-15-25 @ 06:00) (116/75 - 118/78)  RR: 16 (01-15-25 @ 06:00) (15 - 16)  SpO2: 96% (01-15-25 @ 06:00) (96% - 96%)    Gen: no apparent distress  Pulm: no increased work of breathing  Abd: soft, nontender, gravid  Ext: trace pedal edema bilaterally                            12.0   9.47  )-----------( 228      ( 13 Jan 2025 12:48 )             34.7     01-13  130[L]  |  98  |  11  ----------------------------<  99  3.6   |  22  |  0.62  Ca    9.0      13 Jan 2025 12:48  TPro  6.5  /  Alb  3.8  /  TBili  0.3  /  DBili  x   /  AST  17  /  ALT  12  /  AlkPhos  85  01-13      ferrous    sulfate 325 milliGRAM(s) Oral daily  folic acid 1 milliGRAM(s) Oral daily  prenatal multivitamin 1 Tablet(s) Oral daily                    
Patient evaluated at bedside. Endorse FM; denies CTX, LOF, VB.  Patient denies visual disturbances including scotoma, headache, RUQ  pain, nausea, vomiting, epigastric pain, shortness of breath.        T(C): 36.4 (01-21-25 @ 06:20), Max: 36.6 (01-20-25 @ 22:00)  HR: 81 (01-21-25 @ 06:20) (81 - 83)  BP: 118/55 (01-21-25 @ 06:20) (103/73 - 118/55)  RR: 18 (01-21-25 @ 06:20) (18 - 18)  SpO2: 98% (01-21-25 @ 06:20) (96% - 98%)    Gen: no apparent distress  Pulm: no increased work of breathing  Abd: soft, nontender, gravid  Ext: trace pedal edema bilaterally  NST: baseline 130; moderate variability; +accels; -decels; reactive and reassuring tracing  TOCO: irregular contractions 1-2 in 10min        ferrous    sulfate 325 milliGRAM(s) Oral daily  folic acid 1 milliGRAM(s) Oral daily  polyethylene glycol 3350 17 Gram(s) Oral daily  prenatal multivitamin 1 Tablet(s) Oral daily                    
Patient evaluated at bedside. Endorses good FM; denies VB, LOF, CTX  Patient denies visual disturbances including scotoma, headache, RUQ  pain, nausea, vomiting, epigastric pain, shortness of breath.      T(C): 36.6 (25 @ 06:00), Max: 36.7 (25 @ 02:00)  HR: 82 (25 @ 06:00) (80 - 90)  BP: 123/82 (25 @ 06:00) (109/68 - 123/82)  RR: 16 (25 @ 06:00) (16 - 18)  SpO2: 96% (25 @ 06:00) (96% - 99%)  Daily Height in cm: 157.48 (2025 13:47)    Daily Weight Pre-pregnancy in k (2025 13:47)    Gen: no apparent distress  Pulm: no increased work of breathing  Abd: soft, nontender, gravid  Ext: trace pedal edema bilaterally                          12.0   9.47  )-----------( 228      ( 2025 12:48 )             34.7         130[L]  |  98  |  11  ----------------------------<  99  3.6   |  22  |  0.62    Ca    9.0      2025 12:48    TPro  6.5  /  Alb  3.8  /  TBili  0.3  /  DBili  x   /  AST  17  /  ALT  12  /  AlkPhos  85      ferrous    sulfate 325 milliGRAM(s) Oral daily  folic acid 1 milliGRAM(s) Oral daily  prenatal multivitamin 1 Tablet(s) Oral daily                    
Patient states that she is feeling well this morning. No complaints at this time. Denies contractions, leakage of fluid, and vaginal bleeding. Endorses fetal movement.    T(C): 36.5 (01-16-25 @ 21:48), Max: 36.7 (01-16-25 @ 06:00)  HR: 88 (01-16-25 @ 21:48) (86 - 107)  BP: 113/89 (01-16-25 @ 21:48) (113/89 - 122/83)  RR: 18 (01-16-25 @ 21:48) (18 - 18)  SpO2: 98% (01-16-25 @ 21:48) (96% - 100%)  I&O's Summary    Physical Exam:  General: NAD  Abdomen: soft, gravid, nontender  Extremities: nonedematous    EFM Reviewed:  Baseline 130, mod ricky, + accel, - decel; reactive and reassuring  [place]: no ctx    MEDICATIONS  (STANDING):  ferrous    sulfate 325 milliGRAM(s) Oral daily  folic acid 1 milliGRAM(s) Oral daily  polyethylene glycol 3350 17 Gram(s) Oral daily  prenatal multivitamin 1 Tablet(s) Oral daily    MEDICATIONS  (PRN):      LABS:                        12.3   9.71  )-----------( 223      ( 16 Jan 2025 05:30 )             36.5                         
Patient evaluated at bedside. Endorses FM; denies VB, LOF, CTS  Patient denies visual disturbances including scotoma, headache, RUQ  pain, nausea, vomiting, epigastric pain, shortness of breath.       T(C): 36.6 (25 @ 06:31), Max: 36.6 (25 @ 06:31)  HR: 81 (25 @ 06:31) (81 - 81)  BP: 127/84 (25 @ 06:31) (127/84 - 127/84)  RR: 18 (25 @ 06:31) (18 - 18)  SpO2: 98% (25 @ 06:31) (98% - 98%)    Gen: no apparent distress  Pulm: no increased work of breathing  Abd: soft, nontender,   Ext: trace pedal edema bilaterally  NST: baseline 130; moderate variability; +accels; -decels; reactive and reassuring tracing  TOCO: irregular contractions <1 in 10min                          12.4   8.48  )-----------( 185      ( 2025 08:20 )             36.2         135  |  102  |  7   ----------------------------<  90  4.5   |  23  |  0.48[L]    Ca    9.2      2025 08:20    TPro  6.4  /  Alb  3.5  /  TBili  0.4  /  DBili  x   /  AST  14  /  ALT  10  /  AlkPhos  96      ferrous    sulfate 325 milliGRAM(s) Oral daily  folic acid 1 milliGRAM(s) Oral daily  polyethylene glycol 3350 17 Gram(s) Oral daily  prenatal multivitamin 1 Tablet(s) Oral daily                    
Pt feeling well this morning. No complaints - LOF - CTX - VB +FM.   she knows to notify if any changes    T(C): 36.7 (01-15-25 @ 22:00), Max: 36.7 (01-15-25 @ 06:00)  HR: 89 (01-15-25 @ 22:00) (87 - 107)  BP: 131/87 (01-15-25 @ 22:00) (116/75 - 131/87)  RR: 16 (01-15-25 @ 22:00) (16 - 16)  SpO2: 95% (01-15-25 @ 22:00) (95% - 100%)  I&O's Summary    Physical Exam:  General: NAD  Abdomen: soft, gravid, nontender  Extremities: nonedematous    see prior notes for NST review    MEDICATIONS  (STANDING):  ferrous    sulfate 325 milliGRAM(s) Oral daily  folic acid 1 milliGRAM(s) Oral daily  prenatal multivitamin 1 Tablet(s) Oral daily    MEDICATIONS  (PRN):      LABS:

## 2025-01-29 ENCOUNTER — ASOB RESULT (OUTPATIENT)
Age: 35
End: 2025-01-29

## 2025-01-29 ENCOUNTER — APPOINTMENT (OUTPATIENT)
Dept: ANTEPARTUM | Facility: CLINIC | Age: 35
End: 2025-01-29
Payer: COMMERCIAL

## 2025-01-29 PROCEDURE — 76820 UMBILICAL ARTERY ECHO: CPT

## 2025-01-29 PROCEDURE — 76819 FETAL BIOPHYS PROFIL W/O NST: CPT

## 2025-01-29 PROCEDURE — 76817 TRANSVAGINAL US OBSTETRIC: CPT

## 2025-01-29 PROCEDURE — 76815 OB US LIMITED FETUS(S): CPT

## 2025-02-06 ENCOUNTER — APPOINTMENT (OUTPATIENT)
Dept: ANTEPARTUM | Facility: CLINIC | Age: 35
End: 2025-02-06
Payer: COMMERCIAL

## 2025-02-06 ENCOUNTER — ASOB RESULT (OUTPATIENT)
Age: 35
End: 2025-02-06

## 2025-02-06 PROCEDURE — 76820 UMBILICAL ARTERY ECHO: CPT | Mod: 59

## 2025-02-06 PROCEDURE — 76817 TRANSVAGINAL US OBSTETRIC: CPT

## 2025-02-06 PROCEDURE — 76816 OB US FOLLOW-UP PER FETUS: CPT

## 2025-02-06 PROCEDURE — 76818 FETAL BIOPHYS PROFILE W/NST: CPT | Mod: 59

## 2025-02-13 ENCOUNTER — ASOB RESULT (OUTPATIENT)
Age: 35
End: 2025-02-13

## 2025-02-13 ENCOUNTER — APPOINTMENT (OUTPATIENT)
Dept: ANTEPARTUM | Facility: CLINIC | Age: 35
End: 2025-02-13
Payer: COMMERCIAL

## 2025-02-13 PROCEDURE — 76820 UMBILICAL ARTERY ECHO: CPT | Mod: 59

## 2025-02-13 PROCEDURE — 76816 OB US FOLLOW-UP PER FETUS: CPT

## 2025-02-13 PROCEDURE — 76817 TRANSVAGINAL US OBSTETRIC: CPT

## 2025-02-13 PROCEDURE — 76818 FETAL BIOPHYS PROFILE W/NST: CPT

## 2025-02-16 ENCOUNTER — INPATIENT (INPATIENT)
Facility: HOSPITAL | Age: 35
LOS: 2 days | Discharge: HOME | End: 2025-02-19
Attending: OBSTETRICS & GYNECOLOGY | Admitting: OBSTETRICS & GYNECOLOGY
Payer: COMMERCIAL

## 2025-02-16 VITALS
WEIGHT: 149.91 LBS | HEART RATE: 105 BPM | HEIGHT: 62 IN | SYSTOLIC BLOOD PRESSURE: 127 MMHG | TEMPERATURE: 98 F | DIASTOLIC BLOOD PRESSURE: 83 MMHG | RESPIRATION RATE: 17 BRPM

## 2025-02-16 LAB
BASOPHILS # BLD AUTO: 0.01 K/UL — SIGNIFICANT CHANGE UP (ref 0–0.2)
BASOPHILS NFR BLD AUTO: 0.1 % — SIGNIFICANT CHANGE UP (ref 0–2)
BLD GP AB SCN SERPL QL: NEGATIVE — SIGNIFICANT CHANGE UP
EOSINOPHIL # BLD AUTO: 0.03 K/UL — SIGNIFICANT CHANGE UP (ref 0–0.5)
EOSINOPHIL NFR BLD AUTO: 0.3 % — SIGNIFICANT CHANGE UP (ref 0–6)
HCT VFR BLD CALC: 37.4 % — SIGNIFICANT CHANGE UP (ref 34.5–45)
HGB BLD-MCNC: 12.8 G/DL — SIGNIFICANT CHANGE UP (ref 11.5–15.5)
IMM GRANULOCYTES NFR BLD AUTO: 0.6 % — SIGNIFICANT CHANGE UP (ref 0–0.9)
LYMPHOCYTES # BLD AUTO: 1.46 K/UL — SIGNIFICANT CHANGE UP (ref 1–3.3)
LYMPHOCYTES # BLD AUTO: 16.6 % — SIGNIFICANT CHANGE UP (ref 13–44)
MCHC RBC-ENTMCNC: 31.3 PG — SIGNIFICANT CHANGE UP (ref 27–34)
MCHC RBC-ENTMCNC: 34.2 G/DL — SIGNIFICANT CHANGE UP (ref 32–36)
MCV RBC AUTO: 91.4 FL — SIGNIFICANT CHANGE UP (ref 80–100)
MONOCYTES # BLD AUTO: 0.45 K/UL — SIGNIFICANT CHANGE UP (ref 0–0.9)
MONOCYTES NFR BLD AUTO: 5.1 % — SIGNIFICANT CHANGE UP (ref 2–14)
NEUTROPHILS # BLD AUTO: 6.77 K/UL — SIGNIFICANT CHANGE UP (ref 1.8–7.4)
NEUTROPHILS NFR BLD AUTO: 77.3 % — HIGH (ref 43–77)
NRBC BLD AUTO-RTO: 0 /100 WBCS — SIGNIFICANT CHANGE UP (ref 0–0)
PLATELET # BLD AUTO: 213 K/UL — SIGNIFICANT CHANGE UP (ref 150–400)
RBC # BLD: 4.09 M/UL — SIGNIFICANT CHANGE UP (ref 3.8–5.2)
RBC # FLD: 14.2 % — SIGNIFICANT CHANGE UP (ref 10.3–14.5)
RH IG SCN BLD-IMP: POSITIVE — SIGNIFICANT CHANGE UP
WBC # BLD: 8.77 K/UL — SIGNIFICANT CHANGE UP (ref 3.8–10.5)
WBC # FLD AUTO: 8.77 K/UL — SIGNIFICANT CHANGE UP (ref 3.8–10.5)

## 2025-02-16 RX ORDER — CITRIC ACID/SODIUM CITRATE 300-500 MG
15 SOLUTION, ORAL ORAL EVERY 6 HOURS
Refills: 0 | Status: DISCONTINUED | OUTPATIENT
Start: 2025-02-16 | End: 2025-02-17

## 2025-02-16 RX ORDER — OXYTOCIN-SODIUM CHLORIDE 0.9% IV SOLN 30 UNIT/500ML 30-0.9/5 UT/ML-%
2 SOLUTION INTRAVENOUS
Qty: 30 | Refills: 0 | Status: DISCONTINUED | OUTPATIENT
Start: 2025-02-16 | End: 2025-02-17

## 2025-02-16 RX ORDER — SODIUM CHLORIDE 9 G/1000ML
1000 INJECTION, SOLUTION INTRAVENOUS
Refills: 0 | Status: DISCONTINUED | OUTPATIENT
Start: 2025-02-16 | End: 2025-02-17

## 2025-02-16 RX ORDER — OXYTOCIN-SODIUM CHLORIDE 0.9% IV SOLN 30 UNIT/500ML 30-0.9/5 UT/ML-%
167 SOLUTION INTRAVENOUS
Qty: 30 | Refills: 0 | Status: DISCONTINUED | OUTPATIENT
Start: 2025-02-16 | End: 2025-02-17

## 2025-02-16 RX ADMIN — OXYTOCIN-SODIUM CHLORIDE 0.9% IV SOLN 30 UNIT/500ML 2 MILLIUNIT(S)/MIN: 30-0.9/5 SOLUTION at 21:00

## 2025-02-16 NOTE — OB RN PATIENT PROFILE - CURRENT PREGNANCY COMPLICATIONS, OB PROFILE
[facility] [address] [facility], [address] [facility] [address]  [address] [address]  Dept: [phone]  Dept Fax: [phone]: [phone]      Visit Date:11/29/2022     Mitchell Germain is a 72 y.o. female who presents today for:   Chief Complaint   Patient presents with    Follow-up     Ductal carcinoma in situ (DCIS) of right breast        HPI:   Mitchell Germain is a 72 y.o. female who follows in our office with history of DCIS. HPI per previous note in our office:  She underwent screening mammography on January 4, 2021 it showed a new cluster of calcifications in the right breast upper outer aspect middle depth. Diagnostic mammography revealed clustered punctate calcifications in the right breast upper outer aspect middle depth. There were no other suspicious abnormalities seen. She underwent a stereotactic core biopsy on January 22, 2021. Pathology revealed ductal carcinoma in situ, intermediate nuclear grade, solid type with central necrosis and associated microcalcifications. Estrogen receptors 100% positive, progesterone receptor 5% positive. Subsequently she met with the surgeon to discuss her surgical treatment options. She decided to proceed with breast conservation therapy. The patient underwent lumpectomy on February 10, 2021. Final pathology report showed:  A. Breast, right, lumpectomy:       Ductal carcinoma in situ, nuclear grade 3, solid and cribriform       types, with comedonecrosis and microcalcifications, pTis (DCIS) NX. See microscopic for margin status. Changes consistent with previous biopsy site. B. Breast, right, new skin caudal margin, excision:    Fibrocystic changes including dense fibrosis and microcyst formation. Fat necrosis. Negative for malignancy. Due to very close/positive margin the patient underwent reexcision on March 3, 2021. Showed residual and DCIS. New margins were negative. Postsurgical course is unremarkable. Menarche 15.   Menopause 53. . Had children in 20's. Did not breastfeed. Jr Centeno recently, took for 3 to 4 years. No family history of breast or ovarian malignancy. She received total cumulative dose of 5256 cGy in 20 fractions. In May 2021 she started prophylactic hormonal treatment      Interval History 2022:   The patient presents to the office today for follow up and evaluation of history of DCIS. The patient reports she feels well today. She continues on Arimidex with minimal arthralgias and hot flashes. She denies fever/chills, s/s infections, headaches, dizziness, cough, SOB, CP, heart palpitations, changes to bowel/bladder, s/s bleeding, unintentional weight loss, drenching night sweats. She is due for mammogram in January, orders placed. PMH, SH, and FH:  I reviewed the patient's medication and allergy lists as noted on the electronic medical record. The PMH, SH, and FH were also reviewed as noted on the EMR.         Past Medical History:   Diagnosis Date    Active cochlear Meniere's disease     Breast cancer ([facility] 2021    Diabetes mellitus ([facility]     type 2    History of therapeutic radiation 2021    Seasonal allergies     Sleep apnea     Thyroid disease       Past Surgical History:   Procedure Laterality Date    BLADDER SUSPENSION      BREAST LUMPECTOMY Right 2/10/2021    RIGHT BREAST LUMPETOMY, PREOP NEEDLE LOC performed by Mando Epstein MD at [address] [facility] LUMPECTOMY Right 3/3/2021    RE-EXCISION MULTIPLE MARGINS, RIGHT BREAST performed by Mando Epstein MD at [address] [place] [address]      Dr. Quenton Boxer LOC BREAST BIOPSY RIGHT Right 2021    [facility] STEROTACTIC LOC BREAST BIOPSY RIGHT 2021 [place] GUIDED NEEDLE LOC OF RIGHT BREAST Right 2/10/2021    US GUIDED NEEDLE LOC OF RIGHT BREAST 2/10/2021 Hardik Driver MD [facility]      Family History   Problem Relation Age of Onset    High Blood Pressure Mother     Heart Disease Mother     Atrial Fibrillation Mother     Cancer Father         leukemia & Lung    Colon Cancer Maternal Grandmother 64      Social History     Tobacco Use    Smoking status: Never    Smokeless tobacco: Never   Substance Use Topics    Alcohol use: No      Current Outpatient Medications   Medication Sig Dispense Refill    anastrozole (ARIMIDEX) 1 MG tablet TAKE 1 TABLET BY MOUTH EVERY DAY 90 tablet 1    Triamterene-HCTZ (DYAZIDE PO) Take by mouth 37.5-25      Multiple Vitamins-Minerals (VITAMIN D3 COMPLETE PO) Take 2,000 Units by mouth daily      METFORMIN HCL PO Take 500 mg by mouth 4 times daily      pravastatin (PRAVACHOL) 20 MG tablet Take 20 mg by mouth daily. Levothyroxine Sodium 75 MCG CAPS Take 88 mcg by mouth Daily      aspirin 81 MG tablet Take 81 mg by mouth daily. Misc Natural Products (CALCIUM PLUS ADVANCED PO) Take by mouth (Patient not taking: Reported on 11/29/2022)       No current facility-administered medications for this visit. No Known Allergies       Review of Systems:   Review of Systems   Pertinent review of systems noted in HPI, all other ROS negative. Objective:   Physical Exam   /78 (Site: Left Upper Arm, Position: Sitting, Cuff Size: Medium Adult)   Pulse 84   Temp 97.9 °F (36.6 °C) (Oral)   Resp 16   Ht 4' 11\" (1.499 m)   Wt 151 lb (68.5 kg)   SpO2 99%   BMI 30.50 kg/m²    General appearance: No apparent distress, calm and cooperative. HEENT: Pupils equal, round, and reactive to light. Conjunctivae/corneas clear. Oral mucosa moist  Neck: Supple, with full range of motion. Trachea midline. Respiratory:  Normal respiratory effort. Clear to auscultation all lung fields. Cardiovascular: RRR, S1/S2  Abdomen: Soft, non-tender, non-distended with active BS  Musculoskeletal: No clubbing, cyanosis or edema bilaterally.   She is able to ambulate in office  Skin: Skin color, texture, turgor normal.  No visible rashes or lesions. Neurologic:  Neurovascularly intact without any focal sensory/motor deficits. Cranial nerves: II-XII intact, grossly non-focal.  Psychiatric: Alert and oriented x 3, thought content appropriate, normal insight  Capillary Refill: < 3 seconds   Peripheral Pulses: +2 palpable      Imaging Studies and Labs:   CBC:   Lab Results   Component Value Date    HGB 14.5 02/01/2021    HCT 45.7 02/01/2021     BMP:   Lab Results   Component Value Date/Time     02/01/2021 10:59 AM    K 4.0 02/01/2021 10:59 AM     02/01/2021 10:59 AM    CO2 28 02/01/2021 10:59 AM    BUN 17 02/01/2021 10:59 AM    CREATININE 0.9 02/01/2021 10:59 AM    GLUCOSE 92 02/01/2021 10:59 AM    CALCIUM 10.9 02/01/2021 10:59 AM      LFT: No results found for: ALT, AST, GGT, ALKPHOS, BILITOT      Assessment and Plan:     1. Ductal carcinoma in situ (DCIS) of right breast  Screening mammogram 1/4/2021 (+) cluster of calcifications in right breast.  S/p biopsy with final path revealing ductal carcinoma in situ intermediate nuclear grade solid type with central necrosis and associated microcalcifications, ER positive MT positive. S/p lumpectomy 2/10/2021. S/p re-excision d/t close/positive margins (+) residual and DCIS. New margins (-). She completed 20 fractions of radiation with a total dose of 5256 cGy. She started prophylactic hormonal treatment in May 2021. 2. Prophylactic use of anastrozole (Arimidex)  Continues on Arimidex and tolerates well with minimal arthralgias. Continue at current dosing. 3. Encounter for follow-up surveillance of ductal carcinoma in situ (DCIS) of breast  Management of breast cancer survivors who have completed active treatment and have no evidence of disease involve cancer surveillance and lifestyle modifications including pursuing a healthy exercise regimen, minimizing alcohol intake, and refraining from smoking.   Survivor follow up includes updated history and regular physical examination. Radiological imaging to screen for distant recurrence is completed as needed according to patient report of new symptoms that may suggest disease recurrence or metastases. Symptoms suspicious for recurrence or metastases include:    Constitutional symptoms - anorexia, weight loss, malaise, fatigue, insomnia. Bone pain  Pulmonary symptoms - persistent cough or dyspnea (at rest or with exertion)  Neurological symptoms - headache, nausea, vomiting, confusion, weakness, numbness/tingling  Gastrointestinal symptoms - RUQ pain, change in bowel/bladder habits, presence of bloody or tarry stools. No palpable lumps or masses no nipple changes or edema to chest wall. No B type symptoms. Recommend trying evening primrose oil supplement or Vitamin E supplement for hot flashes as well as glucosamine chondroitin or tumeric for joint pain. Continue surveillance. No follow-ups on file. All patient questions answered. Pt voiced understanding. Patient agreed with treatment plan. Follow up as directed. Patient instructed to call for questions or concerns. Electronically signed by   Keane Rinne, APRN - CNP     I spent a total of 36 minutes on the day of the visit. None

## 2025-02-16 NOTE — OB RN PATIENT PROFILE - WEIGHT: PREPREGNANCY IN LBS
PAL called and spoke with pt   -Relayed message from Gina Arias PA-C, awaiting response from Dr. Byrne   -Pt wondering what she should do for this weekend? Will have no pain meds    PAL called and spoke with pharmacist at [facility]   -Pt is unable to pick-up current rx for 0.5 tabs as she should have another month of meds according to her previous order     HYDROcodone-acetaminophen (NORCO)  MG per tablet (Discontinued) 30 tablet 0 3/4/2024 4/1/2024 No   Sig - Route: Take 0.5 tablets by mouth daily as needed for severe pain - Oral     -Pt was unaware rx changed to 0.5 tabs on 3/4/24, continued to take 1 tab daily and is now out of medication   -In order for pt to pick-up meds this weekend, would need new order placed    Routing to Dr. Byrne and/or covering provider to review and advise     Izabela TAMEZ RN  Patient Advocate Liaison - PAL RN  [facility]  [phone]    [phone]

## 2025-02-16 NOTE — OB RN PATIENT PROFILE - POST PARTUM DEPRESSION SCREEN OB 2
PA and lateral chest:

There are no comparisons.

The lung fields are clear.

The cardiac size is normal.

The elda, mediastinum, and skeletal structures are unremarkable.

Impression:

Negative PA and lateral chest.

 

 

Electronically Signed by

Oneal No MD 01/28/2020 07:38 A no

## 2025-02-16 NOTE — OB PROVIDER H&P - NSPRIMARYCAREPROV_OBGYN_ALL_OB
MD//RUBA/JAIME

## 2025-02-17 LAB — T PALLIDUM AB TITR SER: NEGATIVE — SIGNIFICANT CHANGE UP

## 2025-02-17 PROCEDURE — 93010 ELECTROCARDIOGRAM REPORT: CPT

## 2025-02-17 PROCEDURE — 88307 TISSUE EXAM BY PATHOLOGIST: CPT | Mod: 26

## 2025-02-17 RX ORDER — SIMETHICONE 80 MG
80 TABLET,CHEWABLE ORAL EVERY 4 HOURS
Refills: 0 | Status: DISCONTINUED | OUTPATIENT
Start: 2025-02-17 | End: 2025-02-19

## 2025-02-17 RX ORDER — OXYCODONE HYDROCHLORIDE 30 MG/1
5 TABLET ORAL ONCE
Refills: 0 | Status: DISCONTINUED | OUTPATIENT
Start: 2025-02-17 | End: 2025-02-19

## 2025-02-17 RX ORDER — IBUPROFEN 200 MG
600 TABLET ORAL EVERY 6 HOURS
Refills: 0 | Status: DISCONTINUED | OUTPATIENT
Start: 2025-02-17 | End: 2025-02-19

## 2025-02-17 RX ORDER — IBUPROFEN 200 MG
600 TABLET ORAL EVERY 6 HOURS
Refills: 0 | Status: COMPLETED | OUTPATIENT
Start: 2025-02-17 | End: 2026-01-16

## 2025-02-17 RX ORDER — DIBUCAINE 10 MG/G
1 OINTMENT TOPICAL EVERY 6 HOURS
Refills: 0 | Status: DISCONTINUED | OUTPATIENT
Start: 2025-02-17 | End: 2025-02-19

## 2025-02-17 RX ORDER — OXYCODONE HYDROCHLORIDE 30 MG/1
5 TABLET ORAL
Refills: 0 | Status: DISCONTINUED | OUTPATIENT
Start: 2025-02-17 | End: 2025-02-19

## 2025-02-17 RX ORDER — DIPHENHYDRAMINE HCL 12.5MG/5ML
25 ELIXIR ORAL EVERY 6 HOURS
Refills: 0 | Status: DISCONTINUED | OUTPATIENT
Start: 2025-02-17 | End: 2025-02-19

## 2025-02-17 RX ORDER — MODIFIED LANOLIN 100 %
1 CREAM (GRAM) TOPICAL EVERY 6 HOURS
Refills: 0 | Status: DISCONTINUED | OUTPATIENT
Start: 2025-02-17 | End: 2025-02-19

## 2025-02-17 RX ORDER — PRAMOXINE HCL 1 %
1 GEL (GRAM) TOPICAL EVERY 4 HOURS
Refills: 0 | Status: DISCONTINUED | OUTPATIENT
Start: 2025-02-17 | End: 2025-02-19

## 2025-02-17 RX ORDER — FENTANYL/BUPIVACAINE/NS/PF 2MCG/ML-.1
250 PLASTIC BAG, INJECTION (ML) INJECTION
Refills: 0 | Status: DISCONTINUED | OUTPATIENT
Start: 2025-02-17 | End: 2025-02-17

## 2025-02-17 RX ORDER — ACETAMINOPHEN 500 MG/5ML
975 LIQUID (ML) ORAL
Refills: 0 | Status: DISCONTINUED | OUTPATIENT
Start: 2025-02-17 | End: 2025-02-19

## 2025-02-17 RX ORDER — OXYTOCIN-SODIUM CHLORIDE 0.9% IV SOLN 30 UNIT/500ML 30-0.9/5 UT/ML-%
167 SOLUTION INTRAVENOUS
Qty: 30 | Refills: 0 | Status: DISCONTINUED | OUTPATIENT
Start: 2025-02-17 | End: 2025-02-19

## 2025-02-17 RX ORDER — BENZOCAINE 220 MG/G
1 SPRAY, METERED PERIODONTAL EVERY 6 HOURS
Refills: 0 | Status: DISCONTINUED | OUTPATIENT
Start: 2025-02-17 | End: 2025-02-19

## 2025-02-17 RX ORDER — PRENATAL 136/IRON/FOLIC ACID 27 MG-1 MG
1 TABLET ORAL DAILY
Refills: 0 | Status: DISCONTINUED | OUTPATIENT
Start: 2025-02-17 | End: 2025-02-19

## 2025-02-17 RX ORDER — KETOROLAC TROMETHAMINE 30 MG/ML
30 INJECTION, SOLUTION INTRAMUSCULAR; INTRAVENOUS ONCE
Refills: 0 | Status: DISCONTINUED | OUTPATIENT
Start: 2025-02-17 | End: 2025-02-17

## 2025-02-17 RX ORDER — HYDROCORTISONE 10 MG/G
1 CREAM TOPICAL EVERY 6 HOURS
Refills: 0 | Status: DISCONTINUED | OUTPATIENT
Start: 2025-02-17 | End: 2025-02-19

## 2025-02-17 RX ORDER — CLOSTRIDIUM TETANI TOXOID ANTIGEN (FORMALDEHYDE INACTIVATED), CORYNEBACTERIUM DIPHTHERIAE TOXOID ANTIGEN (FORMALDEHYDE INACTIVATED), BORDETELLA PERTUSSIS TOXOID ANTIGEN (GLUTARALDEHYDE INACTIVATED), BORDETELLA PERTUSSIS FILAMENTOUS HEMAGGLUTININ ANTIGEN (FORMALDEHYDE INACTIVATED), BORDETELLA PERTUSSIS PERTACTIN ANTIGEN, AND BORDETELLA PERTUSSIS FIMBRIAE 2/3 ANTIGEN 5; 2; 2.5; 5; 3; 5 [LF]/.5ML; [LF]/.5ML; UG/.5ML; UG/.5ML; UG/.5ML; UG/.5ML
0.5 INJECTION, SUSPENSION INTRAMUSCULAR ONCE
Refills: 0 | Status: DISCONTINUED | OUTPATIENT
Start: 2025-02-17 | End: 2025-02-19

## 2025-02-17 RX ORDER — WITCH HAZEL LEAF
1 FLUID EXTRACT MISCELLANEOUS EVERY 4 HOURS
Refills: 0 | Status: DISCONTINUED | OUTPATIENT
Start: 2025-02-17 | End: 2025-02-19

## 2025-02-17 RX ORDER — MAGNESIUM HYDROXIDE 400 MG/5ML
30 SUSPENSION ORAL
Refills: 0 | Status: DISCONTINUED | OUTPATIENT
Start: 2025-02-17 | End: 2025-02-19

## 2025-02-17 RX ADMIN — Medication 975 MILLIGRAM(S): at 20:41

## 2025-02-17 RX ADMIN — KETOROLAC TROMETHAMINE 30 MILLIGRAM(S): 30 INJECTION, SOLUTION INTRAMUSCULAR; INTRAVENOUS at 10:32

## 2025-02-17 RX ADMIN — Medication 975 MILLIGRAM(S): at 14:57

## 2025-02-17 RX ADMIN — Medication 600 MILLIGRAM(S): at 17:59

## 2025-02-17 RX ADMIN — Medication 3 MILLILITER(S): at 14:29

## 2025-02-17 RX ADMIN — Medication 3 MILLILITER(S): at 22:45

## 2025-02-17 NOTE — OB PROVIDER LABOR PROGRESS NOTE - NS_SUBJECTIVE/OBJECTIVE_OBGYN_ALL_OB_FT
Patient comfortable with epidural.  VE 10/100/+1.  Pitocin at 12mu.
Balloon placed at 20:15 without difficulty  80cc to tension  Patient tolerated procedure well
EFM reviewed  Patient now s/p balloon  SVE: 4/50/-3  Pitocin on at 10mu
EFM reviewed  Pitocin on at 16mu
EFM reviewed  Balloon in situ  Pitocin on at 4mu
EFM reviewed  Pitocin on at 6mu  Epidural in situ
EFM reviewed  SVE: 5/70/-2  AROM at 04:40 for clear fluid  Epidural in situ, patient is comfortable  Pitocin on at 4mu

## 2025-02-17 NOTE — PRE-ANESTHESIA EVALUATION ADULT - NSANTHOSAYNRD_GEN_A_CORE
No. DELON screening performed.  STOP BANG Legend: 0-2 = LOW Risk; 3-4 = INTERMEDIATE Risk; 5-8 = HIGH Risk

## 2025-02-17 NOTE — OB RN DELIVERY SUMMARY - NS_DELIVERYRN_OBGYN_ALL_OB_FT
Plan: Patient used klisyri 1/23 and didn't have a reaction. \\nBriefly discussed PDT for the scalp. Will discuss at follow up and consider referring for tx
Detail Level: Zone
Tiffani Marmolejo - JOE

## 2025-02-17 NOTE — PRE-ANESTHESIA EVALUATION ADULT - NSANTHPMHFT_GEN_ALL_CORE
Patient is   34y  at 39w1d presenting for elective IOL .Denies LOF/VB/CTX endorses FM. Patient was previous antepartum admission for vasa previa, however on  scan was confirmed that the vasa previa had resolved.      Ante: Spontaneous pregnancy. NIPT and anatomy scan wnl. Passed GCT. Denies elevated BP in this pregnancy.  EFW [address]   GBS neg     OBHX:  G1 - current   GynHX: denies fibroids, ovarian cysts, abnormal pap smear, STI/herpes.   MedHX: denies  Surghx: denies  Medications: denies  Allergies: NKDA

## 2025-02-17 NOTE — OB PROVIDER LABOR PROGRESS NOTE - ASSESSMENT
Continue to monitor  Tracing Cat I  
Tracing Cat I  Will plan to start pitocin  Continue to monitor
Tracing Cat II however overall reassuring given moderate variability  Will continue to reposition/resuscitate as needed
Tracing Cat I  Continue to monitor
Tracing Cat I  Continue to monitor
- Anticipate     Dr. Spears aware.
Tracing Cat I   Continue to monitor

## 2025-02-17 NOTE — OB PROVIDER LABOR PROGRESS NOTE - NS_OBIHIFHRDETAILS_OBGYN_ALL_OB_FT
baseline 135, mod ricky, +accels, -decels; Cat I tracing
baseline 125, mod ricky, +accels, -decels; Cat I tracing
baseline 135, mod ricky, +accels, +intermittent decelerations; Cat II tracing
FHT Category I with moderate variability and accelerations, no decelerations
baseline 135, mod ricky, +accels, -decels; Cat I tracing
baseline 135, mod ricky, +Accels, -decels; Cat I tracing
baseline 150, mod ricky, +accels, -decels; Cat I tracing

## 2025-02-17 NOTE — OB RN DELIVERY SUMMARY - NSSELHIDDEN_OBGYN_ALL_OB_FT
[NS_DeliveryAttending1_OBGYN_ALL_OB_FT:[id number]],[NS_DeliveryRN_OBGYN_ALL_OB_FT:[id number]],[NS_CirculateRN2_OBGYN_ALL_OB_FT:[id number]=]

## 2025-02-17 NOTE — OB PROVIDER LABOR PROGRESS NOTE - NS_OBIHICONTRACTIONPATTERNDETAILS_OBGYN_ALL_OB_FT
ctx q2-3 minutes
ctx q2-4 minutes
Contractions q2-5min, difficult to pick on external toco
ctx q2-4 minutes
ctx irregular, 1 in 10 minutes
ctx q2 minutes
ctx q2 minutes

## 2025-02-17 NOTE — OB RN DELIVERY SUMMARY - NS_SEPSISRSKCALC_OBGYN_ALL_OB_FT
EOS calculated successfully. EOS Risk Factor: 0.5/1000 live births ([facility] national incidence); GA=39w2d; Temp=98.2; ROM=4.683; GBS='Negative'; Antibiotics='No antibiotics or any antibiotics < 2 hrs prior to birth'

## 2025-02-17 NOTE — OB PROVIDER DELIVERY SUMMARY - NSPROVIDERDELIVERYNOTE_OBGYN_ALL_OB_FT
Normal vaginal delivery of live female infant, Apgars 9/9, weighing 2950g, in MARY position. Head delivered without complication. No nuchal cord. Shoulders delivered without complication. Infant to mom. Cord clamped x2 and cut. Segment of cord collected for gases. Cord blood collected. Second degree laceration repaired with 2-0 chromic suture. Placenta delivered spontaneously and intact. EBL 200ml. Hemostasis.

## 2025-02-17 NOTE — OB PROVIDER DELIVERY SUMMARY - NSSELHIDDEN_OBGYN_ALL_OB_FT
[NS_DeliveryAttending1_OBGYN_ALL_OB_FT:[id number]],[NS_DeliveryRN_OBGYN_ALL_OB_FT:[id number]],[NS_CirculateRN2_OBGYN_ALL_OB_FT:[id number]=]

## 2025-02-18 ENCOUNTER — TRANSCRIPTION ENCOUNTER (OUTPATIENT)
Age: 35
End: 2025-02-18

## 2025-02-18 RX ORDER — IBUPROFEN 200 MG
1 TABLET ORAL
Qty: 0 | Refills: 0 | DISCHARGE
Start: 2025-02-18

## 2025-02-18 RX ORDER — BENZOCAINE 220 MG/G
1 SPRAY, METERED PERIODONTAL
Qty: 0 | Refills: 0 | DISCHARGE
Start: 2025-02-18

## 2025-02-18 RX ORDER — ACETAMINOPHEN 500 MG/5ML
3 LIQUID (ML) ORAL
Qty: 0 | Refills: 0 | DISCHARGE
Start: 2025-02-18

## 2025-02-18 RX ADMIN — Medication 3 MILLILITER(S): at 21:33

## 2025-02-18 RX ADMIN — Medication 600 MILLIGRAM(S): at 12:40

## 2025-02-18 RX ADMIN — Medication 1 APPLICATION(S): at 12:39

## 2025-02-18 RX ADMIN — Medication 3 MILLILITER(S): at 06:17

## 2025-02-18 RX ADMIN — MAGNESIUM HYDROXIDE 30 MILLILITER(S): 400 SUSPENSION ORAL at 05:30

## 2025-02-18 RX ADMIN — Medication 1 TABLET(S): at 12:40

## 2025-02-18 RX ADMIN — Medication 3 MILLILITER(S): at 13:09

## 2025-02-18 RX ADMIN — Medication 600 MILLIGRAM(S): at 23:31

## 2025-02-18 RX ADMIN — Medication 600 MILLIGRAM(S): at 18:11

## 2025-02-18 RX ADMIN — Medication 975 MILLIGRAM(S): at 20:13

## 2025-02-18 RX ADMIN — DIBUCAINE 1 APPLICATION(S): 10 OINTMENT TOPICAL at 12:40

## 2025-02-18 RX ADMIN — Medication 975 MILLIGRAM(S): at 08:55

## 2025-02-18 RX ADMIN — Medication 600 MILLIGRAM(S): at 05:28

## 2025-02-18 RX ADMIN — Medication 975 MILLIGRAM(S): at 15:03

## 2025-02-18 RX ADMIN — BENZOCAINE 1 SPRAY(S): 220 SPRAY, METERED PERIODONTAL at 12:39

## 2025-02-18 RX ADMIN — Medication 600 MILLIGRAM(S): at 00:12

## 2025-02-18 NOTE — DISCHARGE NOTE OB - FINANCIAL ASSISTANCE
[facility] provides services at a reduced cost to those who are determined to be eligible through [facility]’s financial assistance program. Information regarding [facility]’s financial assistance program can be found by going to [email or web address] or by calling [phone].

## 2025-02-18 NOTE — DISCHARGE NOTE OB - CARE PROVIDER_API CALL
Latia Spears  Obstetrics and Gynecology  [address] [address]  Phone: [phone]  Fax: [phone]  Follow Up Time: 2 months

## 2025-02-18 NOTE — DISCHARGE NOTE OB - AVOID PROLONGED STANDING
Bedside and Verbal shift change report given to [facility] (oncoming nurse) by Deon Kitchen (offgoing nurse). Report included the following information SBAR, Kardex, Procedure Summary, Intake/Output, MAR, Accordion, Recent Results and Cardiac Rhythm NSR. Statement Selected

## 2025-02-18 NOTE — DISCHARGE NOTE OB - PATIENT PORTAL LINK FT
You can access the FollowMyHealth Patient Portal offered by [facility] by registering at the following website: [email or web address]. By joining [facility]’s FollowMyHealth portal, you will also be able to view your health information using other applications (apps) compatible with our system.

## 2025-02-19 VITALS
DIASTOLIC BLOOD PRESSURE: 81 MMHG | HEART RATE: 83 BPM | RESPIRATION RATE: 18 BRPM | OXYGEN SATURATION: 100 % | SYSTOLIC BLOOD PRESSURE: 129 MMHG | TEMPERATURE: 98 F

## 2025-02-19 PROCEDURE — 93005 ELECTROCARDIOGRAM TRACING: CPT

## 2025-02-19 PROCEDURE — 86850 RBC ANTIBODY SCREEN: CPT

## 2025-02-19 PROCEDURE — 88307 TISSUE EXAM BY PATHOLOGIST: CPT

## 2025-02-19 PROCEDURE — 85025 COMPLETE CBC W/AUTO DIFF WBC: CPT

## 2025-02-19 PROCEDURE — 59050 FETAL MONITOR W/REPORT: CPT

## 2025-02-19 PROCEDURE — 36415 COLL VENOUS BLD VENIPUNCTURE: CPT

## 2025-02-19 PROCEDURE — 86901 BLOOD TYPING SEROLOGIC RH(D): CPT

## 2025-02-19 PROCEDURE — 86900 BLOOD TYPING SEROLOGIC ABO: CPT

## 2025-02-19 PROCEDURE — 86780 TREPONEMA PALLIDUM: CPT

## 2025-02-19 RX ADMIN — Medication 3 MILLILITER(S): at 06:02

## 2025-02-19 RX ADMIN — Medication 975 MILLIGRAM(S): at 09:56

## 2025-02-19 RX ADMIN — Medication 975 MILLIGRAM(S): at 10:50

## 2025-02-19 RX ADMIN — Medication 600 MILLIGRAM(S): at 05:38

## 2025-02-19 NOTE — PROGRESS NOTE ADULT - ASSESSMENT
A/P 34y s/p , PPD#2, stable, meeting postpartum milestones   - Pain: well controlled on tylenol/motrin  - GI: Tolerating regular diet  - : urinating without difficulty/pain  - DVT prophylaxis: ambulating frequently  - Dispo: PPD 2, unless otherwise specified     pt is a 55 y/o female with h/o migrianes, prior anuerysm worked up as outpt over the past few yrs now with outpt mri/mra with follow up with no changes.  pt didn't take anything for ha today with b/l temporal tenderness. no neck pain, photophobia or fever, migraine cocktail ordered, ct head, reassess. Neuro: Alert, awake,coherent, interactive, cooperative/consolable. CN 3-12 intact and WITHOUT deficits. NO focal weakness; NO drift; NO droop; NO nystagmus; NO vertigo; NO diplopia; NO headache, NO numbness, tingling, or weakness; NO dysmetria; NO dysdidochokinesia; INTACT gait (tandem, toe and heel).

## 2025-02-19 NOTE — PROGRESS NOTE ADULT - SUBJECTIVE AND OBJECTIVE BOX
Patient evaluated at bedside this morning, resting comfortable in bed, no acute events overnight.  She reports pain is well controlled with tylenol and motrin.  She denies headache, dizziness, chest pain, palpitations, shortness of breath, nausea, vomiting, heavy vaginal bleeding or perineal discomfort. Reports decrease in amount of vaginal bleeding and denies clots.  She has been ambulating without assistance, voiding spontaneously.  Tolerating food well, without nausea/vomit.      Physical Exam:  T(C): 36.7 (02-19-25 @ 06:16), Max: 36.7 (02-18-25 @ 18:30)  HR: 83 (02-19-25 @ 06:16) (67 - 83)  BP: 129/81 (02-19-25 @ 06:16) (117/79 - 129/81)  RR: 18 (02-19-25 @ 06:16) (17 - 18)  SpO2: 100% (02-19-25 @ 06:16) (98% - 100%)    GA: NAD, comfortable, conversational  Abd: soft, nontender, nondistended, no rebound or guarding, uterus firm.  Extremities: no swelling or calf tenderness  Perineum: lochia wnl            acetaminophen     Tablet .. 975 milliGRAM(s) Oral <User Schedule>  benzocaine 20%/menthol 0.5% Spray 1 Spray(s) Topical every 6 hours PRN  dibucaine 1% Ointment 1 Application(s) Topical every 6 hours PRN  diphenhydrAMINE 25 milliGRAM(s) Oral every 6 hours PRN  diphtheria/tetanus/pertussis (acellular) Vaccine (Adacel) 0.5 milliLiter(s) IntraMuscular once  hydrocortisone 1% Cream 1 Application(s) Topical every 6 hours PRN  ibuprofen  Tablet. 600 milliGRAM(s) Oral every 6 hours  lanolin Ointment 1 Application(s) Topical every 6 hours PRN  magnesium hydroxide Suspension 30 milliLiter(s) Oral two times a day PRN  oxyCODONE    IR 5 milliGRAM(s) Oral every 3 hours PRN  oxyCODONE    IR 5 milliGRAM(s) Oral once PRN  oxytocin Infusion 167 milliUNIT(s)/Min IV Continuous <Continuous>  pramoxine 1%/zinc 5% Cream 1 Application(s) Topical every 4 hours PRN  prenatal multivitamin 1 Tablet(s) Oral daily  simethicone 80 milliGRAM(s) Chew every 4 hours PRN  sodium chloride 0.9% lock flush 3 milliLiter(s) IV Push every 8 hours  witch hazel Pads 1 Application(s) Topical every 4 hours PRN  
Patient evaluated at bedside this morning, resting comfortable in bed, no acute events overnight.  She reports pain is well controlled with tylenol and motrin.  She denies headache, dizziness, chest pain, palpitations, shortness of breath, nausea, vomiting, heavy vaginal bleeding or perineal discomfort. Reports decrease in amount of vaginal bleeding and denies clots.  She has been ambulating without assistance, voiding spontaneously.  Tolerating food well, without nausea/vomit.      Physical Exam:  T(C): 36.7 (02-17-25 @ 22:00), Max: 36.8 (02-17-25 @ 18:11)  HR: 87 (02-17-25 @ 22:00) (87 - 88)  BP: 100/64 (02-17-25 @ 22:00) (100/64 - 116/78)  RR: 18 (02-17-25 @ 22:00) (18 - 18)  SpO2: 97% (02-17-25 @ 22:00) (97% - 97%)    GA: NAD, comfortable, conversational  Abd: soft, nontender, nondistended, no rebound or guarding, uterus firm.  Extremities: no swelling or calf tenderness  Perineum: lochia wnl                          12.8   8.77  )-----------( 213      ( 16 Feb 2025 18:33 )             37.4           acetaminophen     Tablet .. 975 milliGRAM(s) Oral <User Schedule>  benzocaine 20%/menthol 0.5% Spray 1 Spray(s) Topical every 6 hours PRN  dibucaine 1% Ointment 1 Application(s) Topical every 6 hours PRN  diphenhydrAMINE 25 milliGRAM(s) Oral every 6 hours PRN  diphtheria/tetanus/pertussis (acellular) Vaccine (Adacel) 0.5 milliLiter(s) IntraMuscular once  hydrocortisone 1% Cream 1 Application(s) Topical every 6 hours PRN  ibuprofen  Tablet. 600 milliGRAM(s) Oral every 6 hours  lanolin Ointment 1 Application(s) Topical every 6 hours PRN  magnesium hydroxide Suspension 30 milliLiter(s) Oral two times a day PRN  oxyCODONE    IR 5 milliGRAM(s) Oral every 3 hours PRN  oxyCODONE    IR 5 milliGRAM(s) Oral once PRN  oxytocin Infusion 167 milliUNIT(s)/Min IV Continuous <Continuous>  pramoxine 1%/zinc 5% Cream 1 Application(s) Topical every 4 hours PRN  prenatal multivitamin 1 Tablet(s) Oral daily  simethicone 80 milliGRAM(s) Chew every 4 hours PRN  sodium chloride 0.9% lock flush 3 milliLiter(s) IV Push every 8 hours  witch hazel Pads 1 Application(s) Topical every 4 hours PRN

## 2025-02-20 ENCOUNTER — APPOINTMENT (OUTPATIENT)
Dept: ANTEPARTUM | Facility: CLINIC | Age: 35
End: 2025-02-20

## 2025-02-25 DIAGNOSIS — Z3A.39 39 WEEKS GESTATION OF PREGNANCY: ICD-10-CM

## 2025-02-25 DIAGNOSIS — Z28.09 IMMUNIZATION NOT CARRIED OUT BECAUSE OF OTHER CONTRAINDICATION: ICD-10-CM

## 2025-05-18 PROBLEM — R00.2 PALPITATIONS: Status: ACTIVE | Noted: 2025-05-18

## 2025-05-19 ENCOUNTER — APPOINTMENT (OUTPATIENT)
Dept: CARDIOLOGY | Facility: CLINIC | Age: 35
End: 2025-05-19
Payer: COMMERCIAL

## 2025-05-19 ENCOUNTER — NON-APPOINTMENT (OUTPATIENT)
Age: 35
End: 2025-05-19

## 2025-05-19 VITALS
SYSTOLIC BLOOD PRESSURE: 138 MMHG | DIASTOLIC BLOOD PRESSURE: 89 MMHG | WEIGHT: 140 LBS | RESPIRATION RATE: 16 BRPM | HEIGHT: 62.5 IN | BODY MASS INDEX: 25.12 KG/M2 | OXYGEN SATURATION: 97 % | HEART RATE: 84 BPM

## 2025-05-19 DIAGNOSIS — R00.2 PALPITATIONS: ICD-10-CM

## 2025-05-19 PROCEDURE — 93306 TTE W/DOPPLER COMPLETE: CPT

## 2025-05-19 PROCEDURE — G2211 COMPLEX E/M VISIT ADD ON: CPT

## 2025-05-19 PROCEDURE — 99204 OFFICE O/P NEW MOD 45 MIN: CPT
